# Patient Record
Sex: FEMALE | Race: BLACK OR AFRICAN AMERICAN | Employment: UNEMPLOYED | URBAN - METROPOLITAN AREA
[De-identification: names, ages, dates, MRNs, and addresses within clinical notes are randomized per-mention and may not be internally consistent; named-entity substitution may affect disease eponyms.]

---

## 2018-10-07 ENCOUNTER — HOSPITAL ENCOUNTER (INPATIENT)
Age: 83
LOS: 5 days | Discharge: NURSING, REHAB OR LONG-TERM CARE | DRG: 065 | End: 2018-10-12
Attending: EMERGENCY MEDICINE | Admitting: INTERNAL MEDICINE
Payer: MEDICARE

## 2018-10-07 ENCOUNTER — APPOINTMENT (OUTPATIENT)
Dept: CT IMAGING | Age: 83
DRG: 065 | End: 2018-10-07
Payer: MEDICARE

## 2018-10-07 DIAGNOSIS — I63.9 CEREBROVASCULAR ACCIDENT (CVA), UNSPECIFIED MECHANISM (HCC): Primary | ICD-10-CM

## 2018-10-07 PROBLEM — R41.4 HEMI-NEGLECT OF LEFT SIDE: Status: ACTIVE | Noted: 2018-10-07

## 2018-10-07 PROBLEM — I10 ESSENTIAL HYPERTENSION: Status: ACTIVE | Noted: 2018-10-07

## 2018-10-07 PROBLEM — F02.80 ALZHEIMER'S DEMENTIA WITHOUT BEHAVIORAL DISTURBANCE (HCC): Status: ACTIVE | Noted: 2018-10-07

## 2018-10-07 PROBLEM — G30.9 ALZHEIMER'S DEMENTIA WITHOUT BEHAVIORAL DISTURBANCE (HCC): Status: ACTIVE | Noted: 2018-10-07

## 2018-10-07 LAB
A/G RATIO: 0.8 (ref 1.1–2.2)
ALBUMIN SERPL-MCNC: 3.9 G/DL (ref 3.4–5)
ALBUMIN SERPL-MCNC: 4.2 G/DL (ref 3.4–5)
ALP BLD-CCNC: 66 U/L (ref 40–129)
ALP BLD-CCNC: 66 U/L (ref 40–129)
ALT SERPL-CCNC: 12 U/L (ref 10–40)
ALT SERPL-CCNC: 13 U/L (ref 10–40)
ANION GAP SERPL CALCULATED.3IONS-SCNC: 12 MMOL/L (ref 3–16)
APTT: 29.7 SEC (ref 26–36)
AST SERPL-CCNC: 23 U/L (ref 15–37)
AST SERPL-CCNC: 26 U/L (ref 15–37)
BASOPHILS ABSOLUTE: 0.1 K/UL (ref 0–0.2)
BASOPHILS RELATIVE PERCENT: 1 %
BILIRUB SERPL-MCNC: 0.6 MG/DL (ref 0–1)
BILIRUB SERPL-MCNC: 0.7 MG/DL (ref 0–1)
BILIRUBIN DIRECT: <0.2 MG/DL (ref 0–0.3)
BILIRUBIN, INDIRECT: ABNORMAL MG/DL (ref 0–1)
BUN BLDV-MCNC: 20 MG/DL (ref 7–20)
CALCIUM SERPL-MCNC: 9.4 MG/DL (ref 8.3–10.6)
CHLORIDE BLD-SCNC: 104 MMOL/L (ref 99–110)
CO2: 22 MMOL/L (ref 21–32)
CREAT SERPL-MCNC: 0.8 MG/DL (ref 0.6–1.2)
EOSINOPHILS ABSOLUTE: 0.2 K/UL (ref 0–0.6)
EOSINOPHILS RELATIVE PERCENT: 3.5 %
GFR AFRICAN AMERICAN: >60
GFR NON-AFRICAN AMERICAN: >60
GLOBULIN: 4.7 G/DL
GLUCOSE BLD-MCNC: 102 MG/DL (ref 70–99)
GLUCOSE BLD-MCNC: 80 MG/DL (ref 70–99)
HCT VFR BLD CALC: 41.9 % (ref 36–48)
HEMOGLOBIN: 14.2 G/DL (ref 12–16)
INR BLD: 1 (ref 0.86–1.14)
LYMPHOCYTES ABSOLUTE: 1.9 K/UL (ref 1–5.1)
LYMPHOCYTES RELATIVE PERCENT: 29.6 %
MCH RBC QN AUTO: 32.9 PG (ref 26–34)
MCHC RBC AUTO-ENTMCNC: 34 G/DL (ref 31–36)
MCV RBC AUTO: 96.9 FL (ref 80–100)
MONOCYTES ABSOLUTE: 0.5 K/UL (ref 0–1.3)
MONOCYTES RELATIVE PERCENT: 8.4 %
NEUTROPHILS ABSOLUTE: 3.6 K/UL (ref 1.7–7.7)
NEUTROPHILS RELATIVE PERCENT: 57.5 %
PDW BLD-RTO: 13.9 % (ref 12.4–15.4)
PERFORMED ON: NORMAL
PLATELET # BLD: 207 K/UL (ref 135–450)
PMV BLD AUTO: 7.3 FL (ref 5–10.5)
POTASSIUM REFLEX MAGNESIUM: 5.8 MMOL/L (ref 3.5–5.1)
PROTHROMBIN TIME: 11.4 SEC (ref 9.8–13)
RBC # BLD: 4.32 M/UL (ref 4–5.2)
SODIUM BLD-SCNC: 138 MMOL/L (ref 136–145)
TOTAL PROTEIN: 8.4 G/DL (ref 6.4–8.2)
TOTAL PROTEIN: 8.6 G/DL (ref 6.4–8.2)
TROPONIN: <0.01 NG/ML
WBC # BLD: 6.3 K/UL (ref 4–11)

## 2018-10-07 PROCEDURE — 93005 ELECTROCARDIOGRAM TRACING: CPT | Performed by: EMERGENCY MEDICINE

## 2018-10-07 PROCEDURE — 70498 CT ANGIOGRAPHY NECK: CPT

## 2018-10-07 PROCEDURE — 2580000003 HC RX 258: Performed by: INTERNAL MEDICINE

## 2018-10-07 PROCEDURE — 84484 ASSAY OF TROPONIN QUANT: CPT

## 2018-10-07 PROCEDURE — 6360000004 HC RX CONTRAST MEDICATION: Performed by: EMERGENCY MEDICINE

## 2018-10-07 PROCEDURE — 85025 COMPLETE CBC W/AUTO DIFF WBC: CPT

## 2018-10-07 PROCEDURE — 99291 CRITICAL CARE FIRST HOUR: CPT

## 2018-10-07 PROCEDURE — 85610 PROTHROMBIN TIME: CPT

## 2018-10-07 PROCEDURE — 36415 COLL VENOUS BLD VENIPUNCTURE: CPT

## 2018-10-07 PROCEDURE — 70450 CT HEAD/BRAIN W/O DYE: CPT

## 2018-10-07 PROCEDURE — 85730 THROMBOPLASTIN TIME PARTIAL: CPT

## 2018-10-07 PROCEDURE — 6360000002 HC RX W HCPCS: Performed by: INTERNAL MEDICINE

## 2018-10-07 PROCEDURE — 2060000000 HC ICU INTERMEDIATE R&B

## 2018-10-07 PROCEDURE — 83036 HEMOGLOBIN GLYCOSYLATED A1C: CPT

## 2018-10-07 PROCEDURE — 80053 COMPREHEN METABOLIC PANEL: CPT

## 2018-10-07 PROCEDURE — 70496 CT ANGIOGRAPHY HEAD: CPT

## 2018-10-07 RX ORDER — CITALOPRAM 10 MG/1
10 TABLET ORAL DAILY
COMMUNITY

## 2018-10-07 RX ORDER — SODIUM CHLORIDE 0.9 % (FLUSH) 0.9 %
10 SYRINGE (ML) INJECTION PRN
Status: DISCONTINUED | OUTPATIENT
Start: 2018-10-07 | End: 2018-10-12 | Stop reason: HOSPADM

## 2018-10-07 RX ORDER — TRAMADOL HYDROCHLORIDE 50 MG/1
50 TABLET ORAL NIGHTLY PRN
Status: ON HOLD | COMMUNITY
End: 2019-12-06 | Stop reason: HOSPADM

## 2018-10-07 RX ORDER — OXYBUTYNIN CHLORIDE 5 MG/1
5 TABLET, EXTENDED RELEASE ORAL DAILY
COMMUNITY

## 2018-10-07 RX ORDER — POLYETHYLENE GLYCOL 3350 17 G/17G
17 POWDER, FOR SOLUTION ORAL DAILY
COMMUNITY

## 2018-10-07 RX ORDER — SODIUM CHLORIDE 0.9 % (FLUSH) 0.9 %
10 SYRINGE (ML) INJECTION EVERY 12 HOURS SCHEDULED
Status: DISCONTINUED | OUTPATIENT
Start: 2018-10-07 | End: 2018-10-12 | Stop reason: HOSPADM

## 2018-10-07 RX ORDER — HYDRALAZINE HYDROCHLORIDE 20 MG/ML
10 INJECTION INTRAMUSCULAR; INTRAVENOUS EVERY 6 HOURS PRN
Status: DISCONTINUED | OUTPATIENT
Start: 2018-10-07 | End: 2018-10-07

## 2018-10-07 RX ORDER — SODIUM CHLORIDE 9 MG/ML
INJECTION, SOLUTION INTRAVENOUS CONTINUOUS
Status: DISCONTINUED | OUTPATIENT
Start: 2018-10-07 | End: 2018-10-12

## 2018-10-07 RX ORDER — ATORVASTATIN CALCIUM 40 MG/1
40 TABLET, FILM COATED ORAL NIGHTLY
Status: DISCONTINUED | OUTPATIENT
Start: 2018-10-07 | End: 2018-10-12 | Stop reason: HOSPADM

## 2018-10-07 RX ORDER — ONDANSETRON 2 MG/ML
4 INJECTION INTRAMUSCULAR; INTRAVENOUS EVERY 6 HOURS PRN
Status: DISCONTINUED | OUTPATIENT
Start: 2018-10-07 | End: 2018-10-12 | Stop reason: HOSPADM

## 2018-10-07 RX ADMIN — IOPAMIDOL 75 ML: 755 INJECTION, SOLUTION INTRAVENOUS at 09:27

## 2018-10-07 RX ADMIN — SODIUM CHLORIDE: 9 INJECTION, SOLUTION INTRAVENOUS at 23:58

## 2018-10-07 RX ADMIN — ENOXAPARIN SODIUM 40 MG: 40 INJECTION SUBCUTANEOUS at 14:41

## 2018-10-07 RX ADMIN — Medication 10 ML: at 23:58

## 2018-10-07 NOTE — H&P
Hospital Medicine History and Physical    10/7/2018    Date of Admission: 10/7/2018    Date of Service: Pt seen/examined on 10/7/2018 and admitted to inpatient. Assessment:  Active Problems:    Acute CVA (cerebrovascular accident) [facility])    Essential hypertension    Estuardo-neglect of left side    Alzheimer's dementia without behavioral disturbance  Resolved Problems:    * No resolved hospital problems. *      Plan:  1. Check MRI brain w/o contrast  2. Referral to neurology   3. Start ASA, statin  4. Check lipid panel, A1C and LFTs   5. PT/OT/ST  6. Gentle fluid hydration, monitor BMP   7. Monitor BP  8. Cont supportive care     Activities: Up with assist  Prophylaxis: lovenox  Code status: DNR-CC    ==========================================================  Chief complaint:  Chief Complaint   Patient presents with    Cerebrovascular Accident     Pt in per [facility] from [facility], Stroke Alert called pta. EMS reports aphasia, facial droop. Onset at 0700 when the nurse went in to see the pt, however per EMS the story kept changing on events and last seen normal.  BS 80 in our ER. History of Presenting Illness: This is a pleasant 80 y.o. female who lives in an ECF was brought to the ER for suspected stroke. In the ER noted to be aphasic and neglecting left side. S/p stroke alert, no TPA. Imaging shows right middle cerebral artery M2 division occlusion, near complete occlusion of intracranial left vertebral artery and questionable occlusion of the left posterior inferior cerebellar artery. Discussed with ER physician who referred for admission.     Past Medical History:      Diagnosis Date    Alzheimer disease     Anxiety     Depression     Hypertension     Muscle weakness     Thyroid disease        Past Surgical History:      Procedure Laterality Date    THYROIDECTOMY, PARTIAL      parathyroid       Medications (prior to admission):  Prior to Admission medications    Medication Sig Start Date End Date Taking? Authorizing Provider   ALPRAZolam (XANAX) 0.25 MG tablet Take 1 tablet by mouth 3 times daily as needed for Sleep or Anxiety for up to 21 doses. 12/29/14   Claudia Mitchell MD   diazepam (VALIUM) 5 MG tablet Take 1 tablet by mouth 2 times daily as needed for Anxiety. 12/29/14   Claudia Mitchell MD   ergocalciferol (ERGOCALCIFEROL) 00674 UNITS capsule Take 50,000 Units by mouth every 14 days. Historical Provider, MD   atenolol (TENORMIN) 100 MG tablet Take 100 mg by mouth daily. Historical Provider, MD       Allergy(ies):  Patient has no known allergies. Social History:  TOBACCO:  reports that she has never smoked. She has never used smokeless tobacco.  ETOH:  reports that she does not drink alcohol. Family History:  Family hx of HTN    Review of Systems:  Unable to obtain as pt aphasic      Vitals and physical examination:  BP (!) 170/49   Pulse 55   Temp 97.8 °F (36.6 °C) (Oral)   Resp 17   SpO2 98%   Gen/overall appearance: Not in acute distress. Alert. Head: Normocephalic, atraumatic  Eyes: EOMI, good acuity  ENT:- Oral mucosa moist  Neck: No JVD, thyromegaly  CVS: Nml S1S2, no MRG, RRR  Pulm: Clear bilaterally. No crackles/wheezes  Gastrointestinal: Soft, NT/ND, +BS  Musculoskeletal: No edema. Warm  Neuro: left augie-neglect, aphasic, left hemiplegia   Psychiatry: Appropriate affect. Not agitated. Skin: Warm, dry with normal turgor.  No rash    Labs/imaging/EKG:  CBC:   Recent Labs      10/07/18   0920   WBC  6.3   HGB  14.2   PLT  207     BMP:    Recent Labs      10/07/18   0920   NA  138   K  5.8*   CL  104   CO2  22   BUN  20   CREATININE  0.8   GLUCOSE  102*     Hepatic:   Recent Labs      10/07/18   0920   AST  23   ALT  12   BILITOT  0.7   ALKPHOS  66       Ct Head Wo Contrast    Result Date: 10/7/2018  EXAMINATION: CT OF THE HEAD WITHOUT CONTRAST  10/7/2018 9:07 am TECHNIQUE: CT of the head was performed without the administration of intravenous contrast. Dose [id number].  -----------------------------  Selam Avina MD  [facility]

## 2018-10-08 ENCOUNTER — APPOINTMENT (OUTPATIENT)
Dept: MRI IMAGING | Age: 83
DRG: 065 | End: 2018-10-08
Payer: MEDICARE

## 2018-10-08 LAB
ANION GAP SERPL CALCULATED.3IONS-SCNC: 13 MMOL/L (ref 3–16)
BASOPHILS ABSOLUTE: 0.1 K/UL (ref 0–0.2)
BASOPHILS RELATIVE PERCENT: 1 %
BUN BLDV-MCNC: 17 MG/DL (ref 7–20)
CALCIUM SERPL-MCNC: 9.2 MG/DL (ref 8.3–10.6)
CHLORIDE BLD-SCNC: 103 MMOL/L (ref 99–110)
CHOLESTEROL, TOTAL: 199 MG/DL (ref 0–199)
CO2: 23 MMOL/L (ref 21–32)
CREAT SERPL-MCNC: 1 MG/DL (ref 0.6–1.2)
EOSINOPHILS ABSOLUTE: 0.1 K/UL (ref 0–0.6)
EOSINOPHILS RELATIVE PERCENT: 2.2 %
ESTIMATED AVERAGE GLUCOSE: 122.6 MG/DL
GFR AFRICAN AMERICAN: >60
GFR NON-AFRICAN AMERICAN: 52
GLUCOSE BLD-MCNC: 110 MG/DL (ref 70–99)
HBA1C MFR BLD: 5.9 %
HCT VFR BLD CALC: 39.5 % (ref 36–48)
HDLC SERPL-MCNC: 41 MG/DL (ref 40–60)
HEMOGLOBIN: 13.4 G/DL (ref 12–16)
LDL CHOLESTEROL CALCULATED: 139 MG/DL
LYMPHOCYTES ABSOLUTE: 1.7 K/UL (ref 1–5.1)
LYMPHOCYTES RELATIVE PERCENT: 27.2 %
MCH RBC QN AUTO: 32.6 PG (ref 26–34)
MCHC RBC AUTO-ENTMCNC: 33.9 G/DL (ref 31–36)
MCV RBC AUTO: 96.1 FL (ref 80–100)
MONOCYTES ABSOLUTE: 0.6 K/UL (ref 0–1.3)
MONOCYTES RELATIVE PERCENT: 9 %
NEUTROPHILS ABSOLUTE: 3.8 K/UL (ref 1.7–7.7)
NEUTROPHILS RELATIVE PERCENT: 60.6 %
PDW BLD-RTO: 13.9 % (ref 12.4–15.4)
PLATELET # BLD: 233 K/UL (ref 135–450)
PMV BLD AUTO: 7.2 FL (ref 5–10.5)
POTASSIUM REFLEX MAGNESIUM: 3.9 MMOL/L (ref 3.5–5.1)
RBC # BLD: 4.11 M/UL (ref 4–5.2)
SODIUM BLD-SCNC: 139 MMOL/L (ref 136–145)
TRIGL SERPL-MCNC: 94 MG/DL (ref 0–150)
VLDLC SERPL CALC-MCNC: 19 MG/DL
WBC # BLD: 6.3 K/UL (ref 4–11)

## 2018-10-08 PROCEDURE — 36415 COLL VENOUS BLD VENIPUNCTURE: CPT

## 2018-10-08 PROCEDURE — 97530 THERAPEUTIC ACTIVITIES: CPT

## 2018-10-08 PROCEDURE — 2580000003 HC RX 258: Performed by: INTERNAL MEDICINE

## 2018-10-08 PROCEDURE — G8997 SWALLOW GOAL STATUS: HCPCS

## 2018-10-08 PROCEDURE — 2060000000 HC ICU INTERMEDIATE R&B

## 2018-10-08 PROCEDURE — 93010 ELECTROCARDIOGRAM REPORT: CPT | Performed by: INTERNAL MEDICINE

## 2018-10-08 PROCEDURE — 6360000002 HC RX W HCPCS: Performed by: INTERNAL MEDICINE

## 2018-10-08 PROCEDURE — 92610 EVALUATE SWALLOWING FUNCTION: CPT

## 2018-10-08 PROCEDURE — 97162 PT EVAL MOD COMPLEX 30 MIN: CPT

## 2018-10-08 PROCEDURE — G8996 SWALLOW CURRENT STATUS: HCPCS

## 2018-10-08 PROCEDURE — G8979 MOBILITY GOAL STATUS: HCPCS

## 2018-10-08 PROCEDURE — 92526 ORAL FUNCTION THERAPY: CPT

## 2018-10-08 PROCEDURE — 80061 LIPID PANEL: CPT

## 2018-10-08 PROCEDURE — 6370000000 HC RX 637 (ALT 250 FOR IP): Performed by: INTERNAL MEDICINE

## 2018-10-08 PROCEDURE — G8978 MOBILITY CURRENT STATUS: HCPCS

## 2018-10-08 PROCEDURE — 80048 BASIC METABOLIC PNL TOTAL CA: CPT

## 2018-10-08 PROCEDURE — G8987 SELF CARE CURRENT STATUS: HCPCS

## 2018-10-08 PROCEDURE — 85025 COMPLETE CBC W/AUTO DIFF WBC: CPT

## 2018-10-08 PROCEDURE — G8988 SELF CARE GOAL STATUS: HCPCS

## 2018-10-08 PROCEDURE — 97166 OT EVAL MOD COMPLEX 45 MIN: CPT

## 2018-10-08 RX ORDER — ASPIRIN 300 MG/1
300 SUPPOSITORY RECTAL DAILY
Status: DISCONTINUED | OUTPATIENT
Start: 2018-10-08 | End: 2018-10-11

## 2018-10-08 RX ORDER — HYDRALAZINE HYDROCHLORIDE 20 MG/ML
10 INJECTION INTRAMUSCULAR; INTRAVENOUS EVERY 6 HOURS PRN
Status: DISCONTINUED | OUTPATIENT
Start: 2018-10-08 | End: 2018-10-12 | Stop reason: HOSPADM

## 2018-10-08 RX ADMIN — ASPIRIN 300 MG: 300 SUPPOSITORY RECTAL at 13:00

## 2018-10-08 RX ADMIN — HYDRALAZINE HYDROCHLORIDE 10 MG: 20 INJECTION INTRAMUSCULAR; INTRAVENOUS at 17:01

## 2018-10-08 RX ADMIN — Medication 10 ML: at 09:21

## 2018-10-08 RX ADMIN — ENOXAPARIN SODIUM 40 MG: 40 INJECTION SUBCUTANEOUS at 09:21

## 2018-10-08 RX ADMIN — SODIUM CHLORIDE: 9 INJECTION, SOLUTION INTRAVENOUS at 13:00

## 2018-10-08 ASSESSMENT — PAIN SCALES - PAIN ASSESSMENT IN ADVANCED DEMENTIA (PAINAD)
FACIALEXPRESSION: 0
NEGVOCALIZATION: 0
TOTALSCORE: 0
BREATHING: 0
TOTALSCORE: 0
CONSOLABILITY: 0
BODYLANGUAGE: 0
NEGVOCALIZATION: 0
CONSOLABILITY: 0
BREATHING: 0
BODYLANGUAGE: 0
FACIALEXPRESSION: 0

## 2018-10-08 ASSESSMENT — PAIN SCALES - WONG BAKER
WONGBAKER_NUMERICALRESPONSE: 0

## 2018-10-08 NOTE — PROGRESS NOTES
Attempted to see patient, family not at bedside. Introduced self to patient and left stroke booklet at bedside. Patient confused and has baseline dementia -unable to carry on conversation. Notified nurse. Nurse will let me know when Carlos Hearing returns.

## 2018-10-08 NOTE — PLAN OF CARE
Problem: Risk for Impaired Skin Integrity  Goal: Tissue integrity - skin and mucous membranes  Structural intactness and normal physiological function of skin and  mucous membranes. Outcome: Ongoing      Problem: Falls - Risk of:  Goal: Will remain free from falls  Will remain free from falls   Outcome: Ongoing      Problem: ACTIVITY INTOLERANCE/IMPAIRED MOBILITY  Goal: Mobility/activity is maintained at optimum level for patient  Outcome: Ongoing      Problem: COMMUNICATION IMPAIRMENT  Goal: Ability to express needs and understand communication  Outcome: Ongoing      Comments: Pt awakens easily to name. Does not respond to questions or commands. Does not verbalize needs. Scores 15 on NIH. Pt incontinent of urine and stool. Pt bathed at this time. Lotion applied to dry flaky skin. Check, change and reposition Q2H/PRN. Fall precautions in place. Camera in room and operating. Bed in lowest position with brakes locked and bed alarm set. Room directly across from nursing station.

## 2018-10-08 NOTE — PROGRESS NOTES
Requires cues for all  Sequencing: Requires cues for all  Perception  Overall Perceptual Status:  (difficult to assess d/t cognitive status and decreased arousal)     Sensation  Overall Sensation Status:  (BLAS d/t pt's cognitive deficits)        LUE PROM (degrees)  LUE General PROM: shoulder flexion PROM 0-~90 degrees; elbow flexion PROM 0-~70 degrees; distally PROM appeared WFL  LUE AROM (degrees)  LUE General AROM: unable to assess d/t pt's cognitive deficits  RUE PROM (degrees)  RUE General PROM: pt resistive with shoulder flexion PROM past 90 degrees  RUE AROM (degrees)  RUE General AROM: unable to assess d/t pt's cognitive deficits - pt flexed shoulder to approx 45 degrees and flexed elbow close to full AROM to scratch around nose  LUE Strength  LUE Strength Comment: unable to assess bilaterally d/t pt's cognitive deficits                  Assessment   Performance deficits / Impairments: Decreased functional mobility ; Decreased ADL status; Decreased ROM; Decreased strength;Decreased cognition;Decreased endurance;Decreased balance  Assessment: Pt is not at baseline level of function and will benefit from skilled OT in order to address the above limitations. Treatment Diagnosis: Decreased functional mobility, ADL status, ROM, strength, functional activity tolerance, balance, cognition related to CVA  Prognosis: Fair;Poor  Decision Making: Medium Complexity  History: lives at [facility], has assist for ADLs/transfers/mobility for safety  Exam: PROM, balance, mobility  Assistance / Modification: assist of 2  Patient Education: Role of OT, OT eval  Barriers to Learning: cognition  REQUIRES OT FOLLOW UP: Yes  Activity Tolerance  Activity Tolerance: Treatment limited secondary to decreased cognition;Patient limited by fatigue  Safety Devices  Safety Devices in place: Yes  Type of devices: All fall risk precautions in place; Left in chair;Chair alarm in place;Nurse notified;Call light within reach; Patient at risk for falls

## 2018-10-08 NOTE — PROGRESS NOTES
Shift assessment complete. VSS at this time. Pt to remain NPO per speech due to aspiration risk. Pt daughter [facility], visiting and updated on POC, all questions in regards to stroke answered to best ability but will also refer stroke educator to visit pt. Pt currently sitting in chair at this time, chair alarm in place. Video monitor remains in use. Call light within reach, will continue to monitor.

## 2018-10-08 NOTE — PROGRESS NOTES
Speech Therapy for Speech/Dysphagia treatment at discharge.     Signature: Ely Issa, [address] [facility]  Speech Language Pathologist

## 2018-10-08 NOTE — PROGRESS NOTES
Home at [facility] - pt is a LTC resident. Pt can typically follow simple one-step commands. Pt is taken to the bathroom every couple hours, sometimes will initiate getting up on her own but usually requires assist for safety. Objective     Observation/Palpation  Posture: Fair  Observation: cervical spine flexed and turned to L - pt unable to correct with gentle tactile or verbal cues    PROM RLE (degrees)  RLE PROM: Exceptions  RLE General PROM: Pt demonstrated minimal resistance with PROM of hip flexion, knee flexion, and ankle DF  AROM RLE (degrees)  RLE AROM: WFL  RLE General AROM: Pt able to transfer to sit EOB functionally   PROM LLE (degrees)  LLE PROM: Exceptions  LLE General PROM: Pt demonstrated resistance of L LE with PROM of hip flexion, knee flexion, and ankle DF  AROM LLE (degrees)  LLE AROM : WFL  LLE General AROM: Pt able to transfer to sit EOB functionally;   Strength RLE  Strength RLE: Exception  Strength LLE  Strength LLE: Exception  Strength Other  Other: Pt unable to to participate in actively moving bilateral LE to EOB; Pt cognitive status limited   Tone RLE  RLE Tone: Normotonic  Tone LLE  LLE Tone: Hypertonic  Tone Description: When PROM tested bilaterally, resistance provided by pt through LE (L worse than R); unable to fully distinguish tone from resistance at this time  Sensation  Overall Sensation Status:  (BLAS d/t pt's cognitive deficits;  Pt responded with some movement upon stimuli to bilateral LE)  Bed mobility  Rolling to Left: Dependent/Total (of 2 to check brief)  Supine to Sit: Dependent/Total (of 2)  Scooting: Dependent/Total (of 2)  Comment: Pt unable to initiate bed mobility due to cogntive status  Transfers  Stand Pivot Transfers: Dependent/Total (Max assistx2 to chair)        Balance  Posture: Fair  Sitting - Static: Fair;-  Sitting - Dynamic: Poor  Comments: Pt required max assistx1 to assume midline without verbal cues; CGA with verbal cues        Assessment   Body

## 2018-10-08 NOTE — PLAN OF CARE
Problem: Risk for Impaired Skin Integrity  Goal: Tissue integrity - skin and mucous membranes  Structural intactness and normal physiological function of skin and  mucous membranes. Intervention: SKIN ASSESSMENT  Skin remains dry and intact at this time. No signs of breakdown. Problem: Falls - Risk of: Intervention: Assess risk factors for falls  Pt currently high fall risk, up to chair at this time. Chair alarm in place. Video monitor in use. Problem: Nutrition  Intervention: Swallowing evaluation  Pt evaluated by speech. To remain NPO at this time.

## 2018-10-09 LAB
CHOLESTEROL, TOTAL: 193 MG/DL (ref 0–199)
GLUCOSE BLD-MCNC: 105 MG/DL (ref 70–99)
GLUCOSE BLD-MCNC: 105 MG/DL (ref 70–99)
GLUCOSE BLD-MCNC: 109 MG/DL (ref 70–99)
GLUCOSE BLD-MCNC: 91 MG/DL (ref 70–99)
GLUCOSE BLD-MCNC: 92 MG/DL (ref 70–99)
HDLC SERPL-MCNC: 42 MG/DL (ref 40–60)
LDL CHOLESTEROL CALCULATED: 132 MG/DL
LV EF: 63 %
LVEF MODALITY: NORMAL
PERFORMED ON: ABNORMAL
PERFORMED ON: NORMAL
PERFORMED ON: NORMAL
TRIGL SERPL-MCNC: 93 MG/DL (ref 0–150)
VLDLC SERPL CALC-MCNC: 19 MG/DL

## 2018-10-09 PROCEDURE — 97530 THERAPEUTIC ACTIVITIES: CPT

## 2018-10-09 PROCEDURE — 2060000000 HC ICU INTERMEDIATE R&B

## 2018-10-09 PROCEDURE — 2580000003 HC RX 258: Performed by: INTERNAL MEDICINE

## 2018-10-09 PROCEDURE — 80061 LIPID PANEL: CPT

## 2018-10-09 PROCEDURE — 6360000002 HC RX W HCPCS: Performed by: INTERNAL MEDICINE

## 2018-10-09 PROCEDURE — 93306 TTE W/DOPPLER COMPLETE: CPT

## 2018-10-09 PROCEDURE — 92526 ORAL FUNCTION THERAPY: CPT

## 2018-10-09 PROCEDURE — 36415 COLL VENOUS BLD VENIPUNCTURE: CPT

## 2018-10-09 PROCEDURE — 6370000000 HC RX 637 (ALT 250 FOR IP): Performed by: INTERNAL MEDICINE

## 2018-10-09 RX ADMIN — Medication 10 ML: at 09:01

## 2018-10-09 RX ADMIN — ASPIRIN 300 MG: 300 SUPPOSITORY RECTAL at 09:00

## 2018-10-09 RX ADMIN — SODIUM CHLORIDE: 9 INJECTION, SOLUTION INTRAVENOUS at 03:38

## 2018-10-09 RX ADMIN — SODIUM CHLORIDE: 9 INJECTION, SOLUTION INTRAVENOUS at 18:11

## 2018-10-09 RX ADMIN — ENOXAPARIN SODIUM 40 MG: 40 INJECTION SUBCUTANEOUS at 09:00

## 2018-10-09 ASSESSMENT — PAIN SCALES - PAIN ASSESSMENT IN ADVANCED DEMENTIA (PAINAD)
TOTALSCORE: 0
TOTALSCORE: 0
NEGVOCALIZATION: 0
CONSOLABILITY: 0
TOTALSCORE: 0
BODYLANGUAGE: 0
BREATHING: 0
NEGVOCALIZATION: 0
FACIALEXPRESSION: 0
TOTALSCORE: 0
NEGVOCALIZATION: 0
BODYLANGUAGE: 0
BREATHING: 0
CONSOLABILITY: 0
TOTALSCORE: 0
TOTALSCORE: 0
CONSOLABILITY: 0
CONSOLABILITY: 0
NEGVOCALIZATION: 0
NEGVOCALIZATION: 0
BODYLANGUAGE: 0
BREATHING: 0
BREATHING: 0
BODYLANGUAGE: 0
TOTALSCORE: 0
BREATHING: 0
BODYLANGUAGE: 0
CONSOLABILITY: 0
FACIALEXPRESSION: 0
NEGVOCALIZATION: 0
NEGVOCALIZATION: 0
CONSOLABILITY: 0
TOTALSCORE: 0
FACIALEXPRESSION: 0
NEGVOCALIZATION: 0
FACIALEXPRESSION: 0
FACIALEXPRESSION: 0
BREATHING: 0
CONSOLABILITY: 0
FACIALEXPRESSION: 0
TOTALSCORE: 0
BODYLANGUAGE: 0
FACIALEXPRESSION: 0
CONSOLABILITY: 0
NEGVOCALIZATION: 0
BREATHING: 0
BODYLANGUAGE: 0
CONSOLABILITY: 0

## 2018-10-09 ASSESSMENT — PAIN SCALES - WONG BAKER
WONGBAKER_NUMERICALRESPONSE: 0

## 2018-10-09 NOTE — PROGRESS NOTES
Daily  Current Treatment Recommendations: Strengthening, ROM, Balance Training, Transfer Training, Gait Training, Functional Mobility Training, Neuromuscular Re-education, Manual Therapy - Joint Manipulation  Plan Comment: limited participation due to cognitive status   Safety Devices  Type of devices: All fall risk precautions in place, Call light within reach, Patient at risk for falls, Bed alarm in place, Left in bed (transport arrived at end of therapy)  Restraints  Initially in place: No                 AM-PAC Score  AM-PAC Inpatient Mobility Raw Score : 6  AM-PAC Inpatient T-Scale Score : 23.55  Mobility Inpatient CMS 0-100% Score: 100  Mobility Inpatient CMS G-Code Modifier : CN          Goals  Short term goals  Time Frame for Short term goals: Discharge  Short term goal 1: Pt will be able to perform bed mobility max assistx1  Short term goal 2: Pt will be able to perform sit to stand with AAD and max assistx1  Short term goal 3: Pt will be able to perform stand pivot transfer with max assistx1  Short term goal 4: Pt will be able to ambulte 15' with AAD and mod assistx1  Long term goals  Time Frame for Long term goals : LTG=STG   No goals met this session    Therapy Time   Individual Concurrent Group Co-treatment   Time In 1141         Time Out 1206         Minutes 25         Timed Code Treatment Minutes: 25 Minutes     Zulay Martinez, SPT     PT observed and directed the above evaluation/treatment.   [address], DPT [id number]

## 2018-10-09 NOTE — PROGRESS NOTES
activity EOB, pt participated less and kepts eyes shut. Bed mobility  Comment: Pt unable to initiate bed mobility due to cognitive status                          Cognition  Overall Cognitive Status: Exceptions  Arousal/Alertness: Inconsistent responses to stimuli  Following Commands: Does not follow commands (followed one command - pt able to squeeze therapist's R hand)  Attention Span: Unable to maintain attention  Initiation: Requires cues for all  Sequencing: Requires cues for all                    Type of ROM/Therapeutic Exercise  Type of ROM/Therapeutic Exercise: PROM  Comment: pt tolerated 2-3 min of PROM while supine in bed - shoulder ABduction to approx 90 degrees, elbow flexion to approx full range, supination/pronation to reduce stiffness                    Assessment   Performance deficits / Impairments: Decreased functional mobility ; Decreased ADL status; Decreased ROM; Decreased strength;Decreased cognition;Decreased endurance;Decreased balance  Assessment: Pt is not at baseline level of function and will benefit from skilled OT in order to address the above limitations. Pt with minimal participation this date, followed one command - limited by decreased cognition and lethargy. Treatment Diagnosis: Decreased functional mobility, ADL status, ROM, strength, functional activity tolerance, balance, cognition related to CVA  Prognosis: Fair;Poor  Patient Education: Role of OT, OT eval, midline orientation  Barriers to Learning: cognition, lethargy  REQUIRES OT FOLLOW UP: Yes  Activity Tolerance  Activity Tolerance: Treatment limited secondary to decreased cognition;Patient limited by fatigue  Safety Devices  Safety Devices in place: Yes  Type of devices: Left in bed;Bed alarm in place; Patient at risk for falls; All fall risk precautions in place;Nurse notified (transport present)          Plan   Plan  Times per week: 2-5x (POC downgraded d/t minimal participation)  Times per day: Daily  Specific

## 2018-10-09 NOTE — PROGRESS NOTES
Reassessment completed. No acute changes in pt since last assessment. Pt checked and found to be clean an dry. Pt turned and repositioned with wedge. Will continue to monitor.

## 2018-10-09 NOTE — PLAN OF CARE
Problem: Falls - Risk of:  Goal: Will remain free from falls  Will remain free from falls   Outcome: Ongoing  Pt remains free from falls. Safety precautions in place. Bed in lowest position, bed wheels locked, call light with in reach, bed alarm on, yellow blanket in place, fall risk wrist band on, SAFE outside of doorway. Will continue to monitor.

## 2018-10-10 LAB
GLUCOSE BLD-MCNC: 72 MG/DL (ref 70–99)
GLUCOSE BLD-MCNC: 73 MG/DL (ref 70–99)
GLUCOSE BLD-MCNC: 81 MG/DL (ref 70–99)
GLUCOSE BLD-MCNC: 89 MG/DL (ref 70–99)
PERFORMED ON: NORMAL

## 2018-10-10 PROCEDURE — 2580000003 HC RX 258: Performed by: INTERNAL MEDICINE

## 2018-10-10 PROCEDURE — 2060000000 HC ICU INTERMEDIATE R&B

## 2018-10-10 PROCEDURE — 99223 1ST HOSP IP/OBS HIGH 75: CPT | Performed by: PSYCHIATRY & NEUROLOGY

## 2018-10-10 PROCEDURE — 92526 ORAL FUNCTION THERAPY: CPT

## 2018-10-10 PROCEDURE — 6360000002 HC RX W HCPCS: Performed by: INTERNAL MEDICINE

## 2018-10-10 PROCEDURE — 92523 SPEECH SOUND LANG COMPREHEN: CPT

## 2018-10-10 PROCEDURE — 6370000000 HC RX 637 (ALT 250 FOR IP): Performed by: INTERNAL MEDICINE

## 2018-10-10 RX ADMIN — Medication 10 ML: at 09:30

## 2018-10-10 RX ADMIN — SODIUM CHLORIDE: 9 INJECTION, SOLUTION INTRAVENOUS at 01:10

## 2018-10-10 RX ADMIN — ASPIRIN 300 MG: 300 SUPPOSITORY RECTAL at 09:27

## 2018-10-10 RX ADMIN — ENOXAPARIN SODIUM 40 MG: 40 INJECTION SUBCUTANEOUS at 09:30

## 2018-10-10 ASSESSMENT — PAIN SCALES - PAIN ASSESSMENT IN ADVANCED DEMENTIA (PAINAD)
NEGVOCALIZATION: 0
BREATHING: 0
TOTALSCORE: 0
BREATHING: 0
BODYLANGUAGE: 0
FACIALEXPRESSION: 0
BODYLANGUAGE: 0
TOTALSCORE: 0
CONSOLABILITY: 0
TOTALSCORE: 0
BODYLANGUAGE: 0
FACIALEXPRESSION: 0
BREATHING: 0
CONSOLABILITY: 0
FACIALEXPRESSION: 0
CONSOLABILITY: 0

## 2018-10-10 ASSESSMENT — PAIN SCALES - WONG BAKER
WONGBAKER_NUMERICALRESPONSE: 0

## 2018-10-10 NOTE — PROGRESS NOTES
Speech Language Pathology  Dysphagia Treatment Note    Name:  Nena Hassan  :   1930  Medical Diagnosis:  Acute CVA (cerebrovascular accident) ([facility] [address] [I63.9]  Acute CVA (cerebrovascular accident) ([facility] [address] [I63.9]  Treatment Diagnosis: Oropharyngeal Dysphagia  Pain level: Did not state     Current Diet Level: NPO     Assessment of Texture Tolerance:  -Impressions:  Pt was positioned upright in bed on RA. Son and daughter in law present and able to provide more hx. The reported pt typically consumed soft foods with thin liquids. ECF had reported that pt was pocketing food at times. Family assisted with assessment of swallow function as pt appeared to respond better when directions and cues were provided by her son. Oral mechanism examination revealed flaccid left side with reduced strength and inability to retract or protrude lips. Lingual protrusion revealed severe left tongue deviation with decreased coordination. Pt required pillows to assist with repositioning. Intermittently throughout pt demonstrate yawning. Trials of thin liquids, nectar thick liquids and puree were provided to assess swallow function. Thin liquids revealed reduced bolus control, some anterior bolus loss, suspected pharyngeal pooling and delayed swallow initiation. Nectar thick liquids revealed reduced bolus manipulation with intermittent oral holding suspected pharyngeal pooling and immediate coughing post swallow. Puree revealed oral holding, delayed/decreased bolus manipulation, suspected prolonged pharyngeal pooling, delayed swallow initiation, suspected pharyngeal stasis and delayed coughing. Post trial of puree pt appeared to fall asleep, trials were ceased at that time. Overall pt demonstrates high risk for aspiration due to CVA, baseline dementia, severity of oral weakness and suspected decreased airway protection with s/s of aspiration/penetration.  At this time pt appears unable to maintain extended periods of alertness in order

## 2018-10-10 NOTE — CONSULTS
Palliative Care:     Patient seen on 10/8 and spoke with patient's son and legal guardian Stefano. [facility] Discussed with him, neurologist would see patient. He is aware of severe dysphagia. Left message for son this AM and left contact information. Awaiting return call.
consultation        Please note a portion of this chart was generated using dragon dictation software. Although every effort was made to ensure the accuracy of this automated transcription, some errors in transcription may have occurred.

## 2018-10-10 NOTE — PROGRESS NOTES
soft, no tenderness, not distended, normal bowel sounds  Musculoskeletal: No cyanosis in digits, neck supple  Neurology: left arm 0/5, left leg withdraws to pain, difficult neuro assessment   Psych: not oriented to time, place and person  Skin: Warm, dry, normal turgor  Extremity exam shows brisk capillary refill. Peripheral pulses are palpable in lower extremities     Labs and Tests:  CBC:   Recent Labs      10/08/18   0439   WBC  6.3   HGB  13.4   PLT  233     BMP:  Recent Labs      10/08/18   0439   NA  139   K  3.9   CL  103   CO2  23   BUN  17   CREATININE  1.0   GLUCOSE  110*     Hepatic: No results for input(s): AST, ALT, ALB, BILITOT, ALKPHOS in the last 72 hours. CTA Head W Contrast   Final Result   Right middle cerebral artery M2 division occlusion. 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 Questionable   occlusion of left posterior inferior cerebellar artery. Findings were discussed with Aldean Res at 10:17 am on 10/7/2018. 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 Questionable   occlusion of left posterior inferior cerebellar artery. Findings were discussed with Aldean Res at 10:17 am on 10/7/2018. CT Head WO Contrast   Final Result   1. No intracranial hemorrhage   2. No CT changes of acute major vascular territory brain ischemia at this time          [facility].    (Results Pending)       Echo reviewed    Problem List  Active Problems:    Acute CVA (cerebrovascular accident) [facility])    Essential hypertension    Estuardo-neglect of left side    Alzheimer's dementia without behavioral disturbance  Resolved Problems:    * No resolved hospital problems.  *       Assessment & Plan:   Acute ischemic stroke  Stroke team eval in the ER, No tPA candidate  Left sided neglect   Rectal asa, Echo reviewed, MRI could not

## 2018-10-10 NOTE — PLAN OF CARE
Problem: Risk for Impaired Skin Integrity  Goal: Tissue integrity - skin and mucous membranes  Structural intactness and normal physiological function of skin and  mucous membranes. Outcome: Ongoing  Repositions frequently with staff assist.  Incontinent bowel/bladder, purewick catheter placed d/t incontinence. Problem: Falls - Risk of:  Goal: Will remain free from falls  Will remain free from falls   Outcome: Ongoing  Resting in bed, bed locked and in lowest position, bed alarm in place. Call light within reach, avasys monitor in place for pt safety. Problem: ACTIVITY INTOLERANCE/IMPAIRED MOBILITY  Goal: Mobility/activity is maintained at optimum level for patient  Outcome: Ongoing  Remains dependent on staff for assist.  Unable to express needs verbally, continues with weakness.

## 2018-10-11 ENCOUNTER — APPOINTMENT (OUTPATIENT)
Dept: CT IMAGING | Age: 83
DRG: 065 | End: 2018-10-11
Payer: MEDICARE

## 2018-10-11 LAB
GLUCOSE BLD-MCNC: 102 MG/DL (ref 70–99)
GLUCOSE BLD-MCNC: 61 MG/DL (ref 70–99)
GLUCOSE BLD-MCNC: 62 MG/DL (ref 70–99)
GLUCOSE BLD-MCNC: 63 MG/DL (ref 70–99)
GLUCOSE BLD-MCNC: 73 MG/DL (ref 70–99)
GLUCOSE BLD-MCNC: 82 MG/DL (ref 70–99)
GLUCOSE BLD-MCNC: 92 MG/DL (ref 70–99)
GLUCOSE BLD-MCNC: 96 MG/DL (ref 70–99)
PERFORMED ON: ABNORMAL
PERFORMED ON: NORMAL

## 2018-10-11 PROCEDURE — 97110 THERAPEUTIC EXERCISES: CPT

## 2018-10-11 PROCEDURE — 92526 ORAL FUNCTION THERAPY: CPT

## 2018-10-11 PROCEDURE — 97530 THERAPEUTIC ACTIVITIES: CPT

## 2018-10-11 PROCEDURE — 70450 CT HEAD/BRAIN W/O DYE: CPT

## 2018-10-11 PROCEDURE — 99233 SBSQ HOSP IP/OBS HIGH 50: CPT | Performed by: PSYCHIATRY & NEUROLOGY

## 2018-10-11 PROCEDURE — 2060000000 HC ICU INTERMEDIATE R&B

## 2018-10-11 PROCEDURE — 6370000000 HC RX 637 (ALT 250 FOR IP): Performed by: INTERNAL MEDICINE

## 2018-10-11 PROCEDURE — 2580000003 HC RX 258

## 2018-10-11 PROCEDURE — 6360000002 HC RX W HCPCS: Performed by: INTERNAL MEDICINE

## 2018-10-11 PROCEDURE — 92507 TX SP LANG VOICE COMM INDIV: CPT

## 2018-10-11 PROCEDURE — 2580000003 HC RX 258: Performed by: INTERNAL MEDICINE

## 2018-10-11 RX ORDER — DEXTROSE MONOHYDRATE 25 G/50ML
INJECTION, SOLUTION INTRAVENOUS
Status: DISCONTINUED
Start: 2018-10-11 | End: 2018-10-11 | Stop reason: WASHOUT

## 2018-10-11 RX ORDER — ASPIRIN 81 MG/1
81 TABLET, CHEWABLE ORAL DAILY
Status: DISCONTINUED | OUTPATIENT
Start: 2018-10-12 | End: 2018-10-12 | Stop reason: HOSPADM

## 2018-10-11 RX ORDER — ATENOLOL 50 MG/1
100 TABLET ORAL DAILY
Status: DISCONTINUED | OUTPATIENT
Start: 2018-10-11 | End: 2018-10-12 | Stop reason: HOSPADM

## 2018-10-11 RX ORDER — DEXTROSE MONOHYDRATE 25 G/50ML
INJECTION, SOLUTION INTRAVENOUS
Status: COMPLETED
Start: 2018-10-11 | End: 2018-10-11

## 2018-10-11 RX ADMIN — ENOXAPARIN SODIUM 40 MG: 40 INJECTION SUBCUTANEOUS at 09:39

## 2018-10-11 RX ADMIN — DESMOPRESSIN ACETATE 40 MG: 0.2 TABLET ORAL at 20:44

## 2018-10-11 RX ADMIN — SODIUM CHLORIDE: 9 INJECTION, SOLUTION INTRAVENOUS at 04:10

## 2018-10-11 RX ADMIN — ATENOLOL 100 MG: 50 TABLET ORAL at 19:22

## 2018-10-11 RX ADMIN — ASPIRIN 300 MG: 300 SUPPOSITORY RECTAL at 09:39

## 2018-10-11 RX ADMIN — DEXTROSE MONOHYDRATE 50 ML: 25 INJECTION, SOLUTION INTRAVENOUS at 07:30

## 2018-10-11 ASSESSMENT — PAIN SCALES - WONG BAKER
WONGBAKER_NUMERICALRESPONSE: 0

## 2018-10-11 NOTE — PROGRESS NOTES
[facility] PROGRESS NOTE    10/11/2018 2:15 PM        Name: Dangelo Calvillo . Admitted: 10/7/2018  Primary Care Provider: Byron Burgos MD (Tel: [phone])    Brief Course:        CC:left side neglect  Subjective:  . Overnight issues noticed with episode of hypoglycemia  Non verbal  Does not move her left arm   Able to eat better on dysphagia 1 diet    Reviewed interval ancillary notes    Current Medications    [START ON 10/12/2018] aspirin chewable tablet 81 mg Daily   atenolol (TENORMIN) tablet 100 mg Daily   hydrALAZINE (APRESOLINE) injection 10 mg Q6H PRN   sodium chloride flush 0.9 % injection 10 mL 2 times per day   sodium chloride flush 0.9 % injection 10 mL PRN   magnesium hydroxide (MILK OF MAGNESIA) 400 MG/5ML suspension 30 mL Daily PRN   ondansetron (ZOFRAN) injection 4 mg Q6H PRN   enoxaparin (LOVENOX) injection 40 mg Daily   influenza quadrivalent split vaccine (FLUZONE;FLUARIX;FLULAVAL;AFLURIA) injection 0.5 mL Once   atorvastatin (LIPITOR) tablet 40 mg Nightly   0.9 % sodium chloride infusion Continuous       Objective:  BP (!) 174/78   Pulse 64   Temp 97.3 °F (36.3 °C) (Temporal)   Resp 18   Ht 5' 6.14\" (1.68 m) Comment: as of 3/21/18  Wt 165 lb 6.4 oz (75 kg) Comment: no pump, with boots on  SpO2 98%   BMI 26.58 kg/m²     Intake/Output Summary (Last 24 hours) at 10/11/18 1415  Last data filed at 10/11/18 0610   Gross per 24 hour   Intake                0 ml   Output              250 ml   Net             -250 ml         General appearance:  Appears comfortable  Eyes: Sclera clear. Pupils equal.  ENT: Moist oral mucosa. Trachea midline, no adenopathy. Cardiovascular: Regular rhythm, normal S1, S2. No murmur. No edema in lower extremities  Respiratory: Not using accessory muscles. Good inspiratory effort.  Clear to auscultation bilaterally, no wheeze or crackles. GI: Abdomen soft, no tenderness, not distended, normal bowel sounds  Musculoskeletal: No cyanosis in digits, neck supple  Neurology: left arm 0/5, left leg withdraws to pain, difficult neuro assessment   Psych: not oriented to time, place and person  Skin: Warm, dry, normal turgor  Extremity exam shows brisk capillary refill. Peripheral pulses are palpable in lower extremities     Labs and Tests:  CBC:   No results for input(s): WBC, HGB, PLT in the last 72 hours. BMP:  No results for input(s): NA, K, CL, CO2, BUN, CREATININE, GLUCOSE in the last 72 hours. Hepatic: No results for input(s): AST, ALT, ALB, BILITOT, ALKPHOS in the last 72 hours. CTA Head W Contrast   Final Result   Right middle cerebral artery M2 division occlusion. 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 Questionable   occlusion of left posterior inferior cerebellar artery. Findings were discussed with Tiffany Wood at 10:17 am on 10/7/2018. 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 Questionable   occlusion of left posterior inferior cerebellar artery. Findings were discussed with Tiffany Wood at 10:17 am on 10/7/2018. CT Head WO Contrast   Final Result   1. No intracranial hemorrhage   2. No CT changes of acute major vascular territory brain ischemia at this time         CT HEAD WO CONTRAST    (Results Pending)       Echo reviewed    Problem List  Active Problems:    Acute CVA (cerebrovascular accident) [facility])    Essential hypertension    Estuardo-neglect of left side    Alzheimer's dementia without behavioral disturbance  Resolved Problems:    * No resolved hospital problems.  *       Assessment & Plan:   Acute ischemic stroke  Stroke team eval in the ER, No tPA candidate  Left sided neglect   Rectal asa, Echo reviewed, MRI could not be

## 2018-10-12 VITALS
SYSTOLIC BLOOD PRESSURE: 155 MMHG | WEIGHT: 165.5 LBS | HEART RATE: 70 BPM | HEIGHT: 66 IN | BODY MASS INDEX: 26.6 KG/M2 | DIASTOLIC BLOOD PRESSURE: 79 MMHG | TEMPERATURE: 97.3 F | OXYGEN SATURATION: 97 % | RESPIRATION RATE: 14 BRPM

## 2018-10-12 LAB
GLUCOSE BLD-MCNC: 85 MG/DL (ref 70–99)
GLUCOSE BLD-MCNC: 88 MG/DL (ref 70–99)
GLUCOSE BLD-MCNC: 94 MG/DL (ref 70–99)
PERFORMED ON: NORMAL

## 2018-10-12 PROCEDURE — 92526 ORAL FUNCTION THERAPY: CPT

## 2018-10-12 PROCEDURE — 99233 SBSQ HOSP IP/OBS HIGH 50: CPT | Performed by: PSYCHIATRY & NEUROLOGY

## 2018-10-12 PROCEDURE — 2580000003 HC RX 258: Performed by: INTERNAL MEDICINE

## 2018-10-12 PROCEDURE — 6360000002 HC RX W HCPCS: Performed by: INTERNAL MEDICINE

## 2018-10-12 PROCEDURE — 92507 TX SP LANG VOICE COMM INDIV: CPT

## 2018-10-12 RX ORDER — ATORVASTATIN CALCIUM 40 MG/1
40 TABLET, FILM COATED ORAL NIGHTLY
Qty: 30 TABLET | Refills: 3 | Status: SHIPPED | OUTPATIENT
Start: 2018-10-12

## 2018-10-12 RX ORDER — DEXTROSE AND SODIUM CHLORIDE 5; .45 G/100ML; G/100ML
INJECTION, SOLUTION INTRAVENOUS CONTINUOUS
Status: DISCONTINUED | OUTPATIENT
Start: 2018-10-12 | End: 2018-10-12 | Stop reason: HOSPADM

## 2018-10-12 RX ORDER — ASPIRIN 81 MG/1
81 TABLET, CHEWABLE ORAL DAILY
Qty: 30 TABLET | Refills: 3 | Status: SHIPPED | OUTPATIENT
Start: 2018-10-13

## 2018-10-12 RX ADMIN — SODIUM CHLORIDE: 9 INJECTION, SOLUTION INTRAVENOUS at 05:12

## 2018-10-12 RX ADMIN — Medication 10 ML: at 09:00

## 2018-10-12 RX ADMIN — DEXTROSE AND SODIUM CHLORIDE: 5; 450 INJECTION, SOLUTION INTRAVENOUS at 10:24

## 2018-10-12 RX ADMIN — ENOXAPARIN SODIUM 40 MG: 40 INJECTION SUBCUTANEOUS at 09:48

## 2018-10-12 ASSESSMENT — PAIN SCALES - WONG BAKER
WONGBAKER_NUMERICALRESPONSE: 0

## 2018-10-12 ASSESSMENT — PAIN SCALES - GENERAL: PAINLEVEL_OUTOF10: 0

## 2018-10-12 NOTE — PROGRESS NOTES
Speech  Language And Dysphagia Treatment Note    Name: Leena Hendrickson  : 1930  Medical Diagnosis: Acute CVA (cerebrovascular accident) [facility]) [I63.9]  Acute CVA (cerebrovascular accident) ([facility].) [I63.9]  Treatment Diagnosis: Oropharyngeal Dysphagia, Dysarthria, Expressive and Receptive Deficits and Cognitive deficits   Pain: 0/10 reported by Pt    Patient's response to therapy:  Pt awake, alert and more verbally responsive this date. Dysphagia Treatment:  Current Diet Level: 1) Initiate Dysphagia I Pureed with NECTAR thick liquids, meds crushed (As able) with puree, No mixed consistencies (No chicken noodle soup, no vegetable soup, no cold cereal), No Drinking Straw  2) 1;1 assist with all intake, check for pocketing and oral care post meals   Diet texture tolerance:  Per RN, pt ws holding/pocketing puree this AM with PCA having to cease feeding. Assessment of Texture Tolerance:  -Impressions: Pt was positioned upright in bed. She appeared awake and alert however, verbal initiation was limited. Trials of puree and nectar thick liquids were provided to assess swallow function. Rolled towel was placed on left side of neck to assist with upright head positioning. Pt demonstrated oral holding with delayed bolus manipulation, decreased A-P propulsion, suspected pharyngeal pooling, delayed swallow initiation and decreased laryngeal elevation. No overt clinical s/s of aspiration/penetration were assessed this date. Overall pt demonstrates increased risk for aspiration due to left sided weakness, oral pocketing/holding, suspected reduced airway protection, delayed swallow initiation and difficulty with self feeding. She also demonstrates increased risk for nutrition/hydration compromise due to baseline dementia with baseline dysphagia, new CVA, decreased overall awareness and severity of dysphagia.  Based on report from RN and results of this assessment, pt demonstrates inconsistency in regards to ability to

## 2018-10-12 NOTE — PROGRESS NOTES
taking these medications    aspirin 81 MG chewable tablet  Take 1 tablet by mouth daily  Notes to patient:  Use: prevents heart attacks and strokes. Side effects: bleeding or bruising more easily, stomach upset. atorvastatin 40 MG tablet  Commonly known as:  LIPITOR  Take 1 tablet by mouth nightly  Notes to patient:  Use: Treatment of high cholesterol  Side effects: Nausea, flatulence, bloating        CONTINUE taking these medications    ALPRAZolam 0.25 MG tablet  Commonly known as:  XANAX  Take 1 tablet by mouth 3 times daily as needed for Sleep or Anxiety for up to 21 doses. Notes to patient:  Use: eases anxiety and calms the nerves  Side effects: feeling lightheaded, sleepy, having blurred eyesight, confusion     atenolol 100 MG tablet  Commonly known as:  TENORMIN  Notes to patient:  Use:  Lowers blood pressure and heart rate, takes workload off heart  Side effects:  Tiredness, shortness of breath, trouble sleeping, impotence     citalopram 10 MG tablet  Commonly known as:  CELEXA  Notes to patient:  Use: Treatment of depression  Side effects: Stomach upset, dry mouth, fatigue, insomnia     diazepam 5 MG tablet  Commonly known as:  VALIUM  Take 1 tablet by mouth 2 times daily as needed for Anxiety.   Notes to patient:  Use: eases anxiety and calms the nerves  Side effects: feeling lightheaded, sleepy, having blurred eyesight, confusion     ergocalciferol 80717 units capsule  Commonly known as:  ERGOCALCIFEROL  Notes to patient:  Use: treat poor parathyroid function, prevent low phosphate levels, to treat rickets, as well as other reasons, verify with physician  Side effects: Upset stomach     oxybutynin 5 MG extended release tablet  Commonly known as:  DITROPAN-XL  Notes to patient:  Use: increases how much uring the bladder can hold and decreases pain caused by spasms  Side effects: feeling lightheaded or sleepy, blurred vision, constipation, dry mouth     polyethylene glycol powder  Commonly known as: GLYCOLAX  Notes to patient:  Use: laxative, treats constipation  Side effects: diarrhea, cramping, upset stomach     traMADol 50 MG tablet  Commonly known as:  ULTRAM  Notes to patient:  Use: treatment of pain  Side effects: constipation, fatigue, dry mouth, insomnia           Where to Get Your Medications      These medications were sent to [address] [place] [address] [facility] [phone] [address] [address]    Phone:  [phone]   · aspirin 81 MG chewable tablet  · atorvastatin 40 MG tablet         Discharge recommendations given to patient. Follow Up. PCP in a week  Disposition. SNF  Activity. activity as tolerated  Diet:        Spent 35 minutes in discharge process.     Signed:  Syed Presley MD     10/13/2018 3:57 PM

## 2018-10-12 NOTE — PLAN OF CARE
Problem: Risk for Impaired Skin Integrity  Goal: Tissue integrity - skin and mucous membranes  Structural intactness and normal physiological function of skin and  mucous membranes. Continuing to turn pt Q2, pt is 2x assist, dependent. No observed skin integrity impairment. Incontinent of urine, female external catheter changed this shift and in place, will continue to monitor for samuel care needs. Problem: COMMUNICATION IMPAIRMENT  Goal: Ability to express needs and understand communication  Outcome: Ongoing  Pt remains non-verbal, grunts at times, responds to some questions with gestures and follows some commands. Problem: Safety:  Goal: Ability to chew and swallow food without choking will improve  Ability to chew and swallow food without choking will improve   Outcome: Ongoing  Pt tolerating crushed meds w/ applesauce this evening. No observed signs of aspiration, will continue to monitor.

## 2018-10-13 NOTE — DISCHARGE SUMMARY
[facility] DISCHARGE SUMMARY     Patient Demographics     Melvin. Saeid Headings  Date of Birth. 2/1/1930  MRN. [id number]      Primary care provider. Mana Rodriges MD  (Tel: [phone])     Admit date: 10/7/2018    Discharge date (blank if same as Note Date): 10/12/2018  Note Date: 10/13/2018      Reason for Hospitalization. Chief Complaint   Patient presents with    Cerebrovascular Accident       Pt in per [facility] from [facility], Stroke Alert called pta. EMS reports aphasia, facial droop. Onset at 0700 when the nurse went in to see the pt, however per EMS the story kept changing on events and last seen normal.  BS 80 in our ER.            Significant Findings. Active Problems:    Acute CVA (cerebrovascular accident) [facility])    Essential hypertension    Estuardo-neglect of left side    Alzheimer's dementia without behavioral disturbance  Resolved Problems:    * No resolved hospital problems. *        Problems and results from this hospitalization that need follow up. 1. Acute ischemic stroke follow up     Significant test results and incidental findings. 1.    CT HEAD WO CONTRAST   Final Result   New hypodensity in the right frontal lobe, consistent with subacute   infarction in the right middle  cerebral artery territory.       Mild degree of atrophy. Moderate degree of low-attenuation in the   periventricular white matter, consistent with small vessel disease.           CTA Head W Contrast   Final Result   Right middle cerebral artery M2 division occlusion.       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 Questionable   occlusion of left posterior inferior cerebellar artery.       Findings were discussed with Tiffany Wood at 10:17 am on 10/7/2018.           CTA Neck W Contrast   Final Result   Right middle cerebral artery M2 division occlusion.       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

## 2018-10-15 LAB
EKG ATRIAL RATE: 71 BPM
EKG DIAGNOSIS: NORMAL
EKG P AXIS: 68 DEGREES
EKG P-R INTERVAL: 166 MS
EKG Q-T INTERVAL: 418 MS
EKG QRS DURATION: 70 MS
EKG QTC CALCULATION (BAZETT): 454 MS
EKG R AXIS: 35 DEGREES
EKG T AXIS: 52 DEGREES
EKG VENTRICULAR RATE: 71 BPM

## 2019-06-12 ENCOUNTER — APPOINTMENT (OUTPATIENT)
Dept: GENERAL RADIOLOGY | Age: 84
DRG: 689 | End: 2019-06-12
Payer: MEDICARE

## 2019-06-12 ENCOUNTER — HOSPITAL ENCOUNTER (INPATIENT)
Age: 84
LOS: 2 days | Discharge: NURSING, REHAB OR LONG-TERM CARE | DRG: 689 | End: 2019-06-14
Attending: EMERGENCY MEDICINE | Admitting: SPECIALIST
Payer: MEDICARE

## 2019-06-12 ENCOUNTER — APPOINTMENT (OUTPATIENT)
Dept: CT IMAGING | Age: 84
DRG: 689 | End: 2019-06-12
Payer: MEDICARE

## 2019-06-12 PROBLEM — R41.0 MENTAL CONFUSION: Status: ACTIVE | Noted: 2019-06-12

## 2019-06-12 LAB
A/G RATIO: 0.9 (ref 1.1–2.2)
ALBUMIN SERPL-MCNC: 3.7 G/DL (ref 3.4–5)
ALP BLD-CCNC: 63 U/L (ref 40–129)
ALT SERPL-CCNC: 17 U/L (ref 10–40)
ANION GAP SERPL CALCULATED.3IONS-SCNC: 11 MMOL/L (ref 3–16)
AST SERPL-CCNC: 16 U/L (ref 15–37)
BACTERIA: ABNORMAL /HPF
BASOPHILS ABSOLUTE: 0.1 K/UL (ref 0–0.2)
BASOPHILS RELATIVE PERCENT: 1 %
BILIRUB SERPL-MCNC: 0.4 MG/DL (ref 0–1)
BILIRUBIN URINE: NEGATIVE
BLOOD, URINE: NEGATIVE
BUN BLDV-MCNC: 23 MG/DL (ref 7–20)
CALCIUM SERPL-MCNC: 9.7 MG/DL (ref 8.3–10.6)
CHLORIDE BLD-SCNC: 107 MMOL/L (ref 99–110)
CLARITY: ABNORMAL
CO2: 23 MMOL/L (ref 21–32)
COLOR: YELLOW
CREAT SERPL-MCNC: 1 MG/DL (ref 0.6–1.2)
EKG ATRIAL RATE: 65 BPM
EKG DIAGNOSIS: NORMAL
EKG P AXIS: 87 DEGREES
EKG P-R INTERVAL: 212 MS
EKG Q-T INTERVAL: 416 MS
EKG QRS DURATION: 70 MS
EKG QTC CALCULATION (BAZETT): 432 MS
EKG R AXIS: 33 DEGREES
EKG T AXIS: 51 DEGREES
EKG VENTRICULAR RATE: 65 BPM
EOSINOPHILS ABSOLUTE: 0.1 K/UL (ref 0–0.6)
EOSINOPHILS RELATIVE PERCENT: 1.9 %
EPITHELIAL CELLS, UA: 2 /HPF (ref 0–5)
GFR AFRICAN AMERICAN: >60
GFR NON-AFRICAN AMERICAN: 52
GLOBULIN: 4 G/DL
GLUCOSE BLD-MCNC: 104 MG/DL (ref 70–99)
GLUCOSE URINE: NEGATIVE MG/DL
HCT VFR BLD CALC: 36.1 % (ref 36–48)
HEMOGLOBIN: 12.4 G/DL (ref 12–16)
HYALINE CASTS: 1 /LPF (ref 0–8)
KETONES, URINE: NEGATIVE MG/DL
LEUKOCYTE ESTERASE, URINE: ABNORMAL
LYMPHOCYTES ABSOLUTE: 1.7 K/UL (ref 1–5.1)
LYMPHOCYTES RELATIVE PERCENT: 23.9 %
MCH RBC QN AUTO: 31.9 PG (ref 26–34)
MCHC RBC AUTO-ENTMCNC: 34.2 G/DL (ref 31–36)
MCV RBC AUTO: 93.2 FL (ref 80–100)
MICROSCOPIC EXAMINATION: YES
MONOCYTES ABSOLUTE: 0.7 K/UL (ref 0–1.3)
MONOCYTES RELATIVE PERCENT: 10 %
NEUTROPHILS ABSOLUTE: 4.4 K/UL (ref 1.7–7.7)
NEUTROPHILS RELATIVE PERCENT: 63.2 %
NITRITE, URINE: NEGATIVE
PDW BLD-RTO: 13.9 % (ref 12.4–15.4)
PH UA: 6.5 (ref 5–8)
PLATELET # BLD: 252 K/UL (ref 135–450)
PMV BLD AUTO: 6.9 FL (ref 5–10.5)
POTASSIUM REFLEX MAGNESIUM: 4.5 MMOL/L (ref 3.5–5.1)
PROTEIN UA: 30 MG/DL
RBC # BLD: 3.87 M/UL (ref 4–5.2)
RBC UA: 1 /HPF (ref 0–4)
SODIUM BLD-SCNC: 141 MMOL/L (ref 136–145)
SPECIFIC GRAVITY UA: 1.02 (ref 1–1.03)
TOTAL PROTEIN: 7.7 G/DL (ref 6.4–8.2)
TROPONIN: 0.14 NG/ML
URINE REFLEX TO CULTURE: YES
URINE TYPE: ABNORMAL
UROBILINOGEN, URINE: 1 E.U./DL
WBC # BLD: 7 K/UL (ref 4–11)
WBC UA: 9 /HPF (ref 0–5)

## 2019-06-12 PROCEDURE — 80053 COMPREHEN METABOLIC PANEL: CPT

## 2019-06-12 PROCEDURE — 36415 COLL VENOUS BLD VENIPUNCTURE: CPT

## 2019-06-12 PROCEDURE — G0378 HOSPITAL OBSERVATION PER HR: HCPCS

## 2019-06-12 PROCEDURE — 2580000003 HC RX 258: Performed by: SPECIALIST

## 2019-06-12 PROCEDURE — 85025 COMPLETE CBC W/AUTO DIFF WBC: CPT

## 2019-06-12 PROCEDURE — 93010 ELECTROCARDIOGRAM REPORT: CPT | Performed by: INTERNAL MEDICINE

## 2019-06-12 PROCEDURE — 81001 URINALYSIS AUTO W/SCOPE: CPT

## 2019-06-12 PROCEDURE — 70450 CT HEAD/BRAIN W/O DYE: CPT

## 2019-06-12 PROCEDURE — 84484 ASSAY OF TROPONIN QUANT: CPT

## 2019-06-12 PROCEDURE — 87086 URINE CULTURE/COLONY COUNT: CPT

## 2019-06-12 PROCEDURE — 96361 HYDRATE IV INFUSION ADD-ON: CPT

## 2019-06-12 PROCEDURE — 1200000000 HC SEMI PRIVATE

## 2019-06-12 PROCEDURE — 99285 EMERGENCY DEPT VISIT HI MDM: CPT

## 2019-06-12 PROCEDURE — 71045 X-RAY EXAM CHEST 1 VIEW: CPT

## 2019-06-12 PROCEDURE — 6370000000 HC RX 637 (ALT 250 FOR IP): Performed by: SPECIALIST

## 2019-06-12 PROCEDURE — 93005 ELECTROCARDIOGRAM TRACING: CPT | Performed by: EMERGENCY MEDICINE

## 2019-06-12 PROCEDURE — 6370000000 HC RX 637 (ALT 250 FOR IP)

## 2019-06-12 RX ORDER — BISACODYL 10 MG
10 SUPPOSITORY, RECTAL RECTAL DAILY PRN
COMMUNITY

## 2019-06-12 RX ORDER — ACETAMINOPHEN 325 MG/1
650 TABLET ORAL EVERY 6 HOURS PRN
Status: ON HOLD | COMMUNITY
End: 2019-06-14 | Stop reason: HOSPADM

## 2019-06-12 RX ORDER — POLYETHYLENE GLYCOL 3350 17 G/17G
17 POWDER, FOR SOLUTION ORAL DAILY
Status: DISCONTINUED | OUTPATIENT
Start: 2019-06-13 | End: 2019-06-13

## 2019-06-12 RX ORDER — ATENOLOL 25 MG/1
25 TABLET ORAL DAILY
Status: DISCONTINUED | OUTPATIENT
Start: 2019-06-13 | End: 2019-06-15 | Stop reason: HOSPADM

## 2019-06-12 RX ORDER — ATORVASTATIN CALCIUM 40 MG/1
40 TABLET, FILM COATED ORAL NIGHTLY
Status: DISCONTINUED | OUTPATIENT
Start: 2019-06-12 | End: 2019-06-15 | Stop reason: HOSPADM

## 2019-06-12 RX ORDER — BISACODYL 10 MG
10 SUPPOSITORY, RECTAL RECTAL DAILY PRN
Status: DISCONTINUED | OUTPATIENT
Start: 2019-06-12 | End: 2019-06-15 | Stop reason: HOSPADM

## 2019-06-12 RX ORDER — TRAMADOL HYDROCHLORIDE 50 MG/1
50 TABLET ORAL NIGHTLY PRN
Status: DISCONTINUED | OUTPATIENT
Start: 2019-06-13 | End: 2019-06-15 | Stop reason: HOSPADM

## 2019-06-12 RX ORDER — ASPIRIN 81 MG/1
81 TABLET, CHEWABLE ORAL DAILY
Status: DISCONTINUED | OUTPATIENT
Start: 2019-06-13 | End: 2019-06-15 | Stop reason: HOSPADM

## 2019-06-12 RX ORDER — DIAZEPAM 5 MG/1
5 TABLET ORAL EVERY 12 HOURS PRN
Status: DISCONTINUED | OUTPATIENT
Start: 2019-06-12 | End: 2019-06-15 | Stop reason: HOSPADM

## 2019-06-12 RX ORDER — ALPRAZOLAM 0.25 MG/1
0.25 TABLET ORAL 3 TIMES DAILY PRN
Status: DISCONTINUED | OUTPATIENT
Start: 2019-06-12 | End: 2019-06-15 | Stop reason: HOSPADM

## 2019-06-12 RX ORDER — DEXTROSE AND SODIUM CHLORIDE 5; .45 G/100ML; G/100ML
INJECTION, SOLUTION INTRAVENOUS CONTINUOUS
Status: DISCONTINUED | OUTPATIENT
Start: 2019-06-12 | End: 2019-06-15 | Stop reason: HOSPADM

## 2019-06-12 RX ORDER — OXYBUTYNIN CHLORIDE 5 MG/1
5 TABLET, EXTENDED RELEASE ORAL DAILY
Status: DISCONTINUED | OUTPATIENT
Start: 2019-06-13 | End: 2019-06-15 | Stop reason: HOSPADM

## 2019-06-12 RX ORDER — CITALOPRAM 10 MG/1
10 TABLET ORAL DAILY
Status: DISCONTINUED | OUTPATIENT
Start: 2019-06-13 | End: 2019-06-15 | Stop reason: HOSPADM

## 2019-06-12 RX ORDER — ERGOCALCIFEROL 1.25 MG/1
50000 CAPSULE ORAL
Status: DISCONTINUED | OUTPATIENT
Start: 2019-06-26 | End: 2019-06-15 | Stop reason: HOSPADM

## 2019-06-12 RX ORDER — ACETAMINOPHEN 325 MG/1
650 TABLET ORAL EVERY 6 HOURS PRN
Status: DISCONTINUED | OUTPATIENT
Start: 2019-06-12 | End: 2019-06-15 | Stop reason: HOSPADM

## 2019-06-12 RX ADMIN — DIAZEPAM 5 MG: 5 TABLET ORAL at 23:03

## 2019-06-12 RX ADMIN — DEXTROSE AND SODIUM CHLORIDE: 5; 450 INJECTION, SOLUTION INTRAVENOUS at 23:20

## 2019-06-12 RX ADMIN — ATORVASTATIN CALCIUM 40 MG: 40 TABLET, FILM COATED ORAL at 23:03

## 2019-06-12 ASSESSMENT — HEART SCORE: ECG: 0

## 2019-06-12 NOTE — ED PROVIDER NOTES
[address] [place] [address]  eMERGENCY dEPARTMENT eNCOUnter        Pt Name: Emily Ochoa  MRN: [id number]  [place] 2/11/1930  Date of evaluation: 6/12/2019  Provider: Ann Marie Velez MD  PCP: Janae Huynh MD      [address] [place] [address]       Chief Complaint   Patient presents with    Altered Mental Status       HISTORY OFPRESENT ILLNESS   (Location/Symptom, Timing/Onset, Context/Setting, Quality, Duration, Modifying Factors,Severity)  Note limiting factors. Emily Ochoa is a 80 y.o. female       Brought in from a local nursing home with a change in her mental status. Patient had a stroke about a year ago affecting her left side. She normally does not speak very much. This is confirmed by family members at the bedside. She has had tremors that would come and go but only lasts a few seconds since the stroke. Today the report I have is that she was shaking in her eyes rolled to the back of her head and then she became unresponsive. She is prone to urinary tract infections. Other than that the history of present illness limited due to the patient being nonverbal.    Nursing Noteswere all reviewed and agreed with or any disagreements were addressed  in the HPI. REVIEW OF SYSTEMS    (2-9 systems for level 4, 10 or more for level 5)     Review of Systems   Unable to perform ROS: Patient nonverbal   Neurological: Positive for tremors.          PAST MEDICAL HISTORY     Past Medical History:   Diagnosis Date    Alzheimer disease     Alzheimer's dementia without behavioral disturbance 10/7/2018    Anxiety     Depression     Hypertension     Muscle weakness     Thyroid disease          SURGICAL HISTORY     Past Surgical History:   Procedure Laterality Date    THYROIDECTOMY, PARTIAL      parathyroid         CURRENTMEDICATIONS       Current Discharge Medication List      CONTINUE these medications which have NOT CHANGED    Details   acetaminophen (TYLENOL) 325 MG tablet Take 650 mg by mouth every 6 hours as needed for Pain      bisacodyl (DULCOLAX) 10 MG suppository Place 10 mg rectally daily as needed for Constipation (if MOM ineffective)      magnesium hydroxide (MILK OF MAGNESIA) 400 MG/5ML suspension Take by mouth daily as needed for Constipation      aspirin 81 MG chewable tablet Take 1 tablet by mouth daily  Qty: 30 tablet, Refills: 3      atorvastatin (LIPITOR) 40 MG tablet Take 1 tablet by mouth nightly  Qty: 30 tablet, Refills: 3      citalopram (CELEXA) 10 MG tablet Take 10 mg by mouth daily      oxybutynin (DITROPAN-XL) 5 MG extended release tablet Take 5 mg by mouth daily      polyethylene glycol (GLYCOLAX) powder Take 17 g by mouth daily      traMADol (ULTRAM) 50 MG tablet Take 50 mg by mouth nightly as needed for Pain. .      ergocalciferol (ERGOCALCIFEROL) 35520 UNITS capsule Take 50,000 Units by mouth every 14 days. atenolol (TENORMIN) 25 MG tablet Take 25 mg by mouth daily              ALLERGIES     Patient has no known allergies. FAMILY HISTORY     History reviewed. No pertinent family history. SOCIAL HISTORY       Social History     Socioeconomic History    Marital status:       Spouse name: None    Number of children: 6    Years of education: None    Highest education level: None   Occupational History    None   Social Needs    Financial resource strain: None    Food insecurity:     Worry: None     Inability: None    Transportation needs:     Medical: None     Non-medical: None   Tobacco Use    Smoking status: Never Smoker    Smokeless tobacco: Never Used   Substance and Sexual Activity    Alcohol use: No    Drug use: No    Sexual activity: Never   Lifestyle    Physical activity:     Days per week: None     Minutes per session: None    Stress: None   Relationships    Social connections:     Talks on phone: None     Gets together: None     Attends Ep[employer] service: None     Active member of club or organization: None     Attends meetings of clubs or organizations: None     Relationship status: None    Intimate partner violence:     Fear of current or ex partner: None     Emotionally abused: None     Physically abused: None     Forced sexual activity: None   Other Topics Concern    None   Social History Narrative    None       SCREENINGS      Heart Score for chest pain patients  History: Slightly Suspicious  ECG: Normal  Patient Age: > 65 years  *Risk factors for Atherosclerotic disease: Hypertension  Risk Factors: 1 or 2 risk factors  Troponin: > 3X normal limit  Heart Score Total: 5      PHYSICAL EXAM    (up to 7 for level 4, 8 or more for level 5)     ED Triage Vitals [06/12/19 1500]   BP Temp Temp src Pulse Resp SpO2 Height Weight   124/62 98.8 °F (37.1 °C) -- 62 -- 96 % -- 165 lb 12.6 oz (75.2 kg)      weight is 165 lb 12.6 oz (75.2 kg). Her oral temperature is 98.8 °F (37.1 °C). Her blood pressure is 132/67 and her pulse is 65. Her respiration is 16 and oxygen saturation is 96%. Physical Exam   Constitutional: She appears well-developed and well-nourished. Elderly female resting in bed. She is holding her left arm across her abdomen. She has both knees flexed. She is not demonstrating any respiratory distress. HENT:   Head: Normocephalic and atraumatic. Right Ear: External ear normal.   Left Ear: External ear normal.   Eyes: Pupils are equal, round, and reactive to light. Right eye exhibits no discharge. Left eye exhibits no discharge. No scleral icterus. Bilateral arcus senilis   Neck: Normal range of motion. No JVD present. No tracheal deviation present. No thyromegaly present. Cardiovascular: Normal rate and regular rhythm. Exam reveals no gallop and no friction rub. No murmur heard. Pulmonary/Chest: Effort normal. No stridor. No respiratory distress. She has decreased breath sounds. She has no wheezes. She has no rales. Abdominal: Soft. She exhibits no distension. There is no tenderness. There is no rebound and no guarding. Musculoskeletal: She exhibits no edema or tenderness. Neurological:   Patient is awake. Eyes are open. She does not follow commands. She resists movements of all 4 extremities. No obvious facial drooping. She will mumble and occasional word but nothing that is understandable. Skin: Skin is warm and dry. No rash (On exposed body surfaces) noted. She is not diaphoretic. Psychiatric: She has a normal mood and affect.  Her behavior is normal. Thought content normal.       DIAGNOSTIC RESULTS   LABS:    Results for orders placed or performed during the hospital encounter of 06/12/19   CBC Auto Differential   Result Value Ref Range    WBC 7.0 4.0 - 11.0 K/uL    RBC 3.87 (L) 4.00 - 5.20 M/uL    Hemoglobin 12.4 12.0 - 16.0 g/dL    Hematocrit 36.1 36.0 - 48.0 %    MCV 93.2 80.0 - 100.0 fL    MCH 31.9 26.0 - 34.0 pg    MCHC 34.2 31.0 - 36.0 g/dL    RDW 13.9 12.4 - 15.4 %    Platelets 075 596 - 065 K/uL    MPV 6.9 5.0 - 10.5 fL    Neutrophils % 63.2 %    Lymphocytes % 23.9 %    Monocytes % 10.0 %    Eosinophils % 1.9 %    Basophils % 1.0 %    Neutrophils # 4.4 1.7 - 7.7 K/uL    Lymphocytes # 1.7 1.0 - 5.1 K/uL    Monocytes # 0.7 0.0 - 1.3 K/uL    Eosinophils # 0.1 0.0 - 0.6 K/uL    Basophils # 0.1 0.0 - 0.2 K/uL   Comprehensive Metabolic Panel w/ Reflex to MG   Result Value Ref Range    Sodium 141 136 - 145 mmol/L    Potassium reflex Magnesium 4.5 3.5 - 5.1 mmol/L    Chloride 107 99 - 110 mmol/L    CO2 23 21 - 32 mmol/L    Anion Gap 11 3 - 16    Glucose 104 (H) 70 - 99 mg/dL    BUN 23 (H) 7 - 20 mg/dL    CREATININE 1.0 0.6 - 1.2 mg/dL    GFR Non-African American 52 (A) >60    GFR African American >60 >60    Calcium 9.7 8.3 - 10.6 mg/dL    Total Protein 7.7 6.4 - 8.2 g/dL    Alb 3.7 3.4 - 5.0 g/dL    Albumin/Globulin Ratio 0.9 (L) 1.1 - 2.2    Total Bilirubin 0.4 0.0 - 1.0 mg/dL    Alkaline Phosphatase 63 40 - 129 U/L    ALT 17 10 - 40 U/L    AST 16 15 - 37 U/L    Globulin 4.0 g/dL   Troponin   Result Value Ref Range Troponin 0.14 (H) <0.01 ng/mL   Urinalysis Reflex to Culture   Result Value Ref Range    Color, UA YELLOW Straw/Yellow    Clarity, UA CLOUDY (A) Clear    Glucose, Ur Negative Negative mg/dL    Bilirubin Urine Negative Negative    Ketones, Urine Negative Negative mg/dL    Specific Gravity, UA 1.020 1.005 - 1.030    Blood, Urine Negative Negative    pH, UA 6.5 5.0 - 8.0    Protein, UA 30 (A) Negative mg/dL    Urobilinogen, Urine 1.0 <2.0 E.U./dL    Nitrite, Urine Negative Negative    Leukocyte Esterase, Urine TRACE (A) Negative    Microscopic Examination YES     Urine Reflex to Culture Yes     Urine Type Not Specified    Microscopic Urinalysis   Result Value Ref Range    Bacteria, UA 3+ (A) /HPF    Hyaline Casts, UA 1 0 - 8 /LPF    WBC, UA 9 (H) 0 - 5 /HPF    RBC, UA 1 0 - 4 /HPF    Epi Cells 2 0 - 5 /HPF   EKG 12 Lead   Result Value Ref Range    Ventricular Rate 65 BPM    Atrial Rate 65 BPM    P-R Interval 212 ms    QRS Duration 70 ms    Q-T Interval 416 ms    QTc Calculation (Bazett) 432 ms    P Axis 87 degrees    R Axis 33 degrees    T Axis 51 degrees    Diagnosis       Sinus rhythm with prolonged AV conductionOtherwise normal ECGWhen compared with ECG of 07-OCT-2018 09:30,Premature atrial complexes are no longer PresentPR interval has increasedConfirmed by Shanna, [address] [place] [address] ([id number]) on 6/12/2019 4:24:50 PM       All other labs were within normal range or not returned as of this dictation. EKG: All EKG's are interpreted by the Emergency Department Physician who either signs orCo-signs this chart in the absence of a cardiologist.    EKG visualized primarily interpreted by myself shows sinus rhythm at a rate of 65. There is a first-degree AV block. Otherwise ST-T wave intervals are all unremarkable.     RADIOLOGY:   Non-plain film images such as CT, Ultrasound and MRI are read by the radiologist. Plain radiographic images are visualized and preliminarily interpreted by the  EDProvider with the below findings:    Ct Head Wo Contrast    Result Date: 6/12/2019  EXAMINATION: CT OF THE HEAD WITHOUT CONTRAST  6/12/2019 3:30 pm TECHNIQUE: CT of the head was performed without the administration of intravenous contrast. Dose modulation, iterative reconstruction, and/or weight based adjustment of the mA/kV was utilized to reduce the radiation dose to as low as reasonably achievable. COMPARISON: 10/11/2018 HISTORY: ORDERING SYSTEM PROVIDED HISTORY: Hx of CVA, today possible seizure TECHNOLOGIST PROVIDED HISTORY: Has a \"code stroke\" or \"stroke alert\" been called? ->No FINDINGS: BRAIN/VENTRICLES: There is no acute intracranial hemorrhage, mass effect or midline shift. No abnormal extra-axial fluid collection. Remote segmental infarct is demonstrated within the lateral aspect of the right frontal lobe. There is prominence of the ventricles and sulci due to global parenchymal volume loss. There are nonspecific areas of hypoattenuation within the periventricular and subcortical white matter, which likely represent chronic microvascular ischemic change. ORBITS: The visualized portion of the orbits demonstrate no acute abnormality. SINUSES: The visualized paranasal sinuses and mastoid air cells demonstrate no acute abnormality. SOFT TISSUES/SKULL: No acute abnormality of the visualized skull or soft tissues. No acute intracranial abnormality. Xr Chest Portable    Result Date: 6/12/2019  EXAMINATION: ONE XRAY VIEW OF THE CHEST 6/12/2019 3:43 pm COMPARISON: 12/26/2014 HISTORY: ORDERING SYSTEM PROVIDED HISTORY: possible seizure TECHNOLOGIST PROVIDED HISTORY: Reason for exam:->possible seizure Ordering Physician Provided Reason for Exam: possible seizure Acuity: Acute Type of Exam: Initial FINDINGS: Heart size and pulmonary vessels within normal limits. Low lung volumes resulting in basilar vascular crowding. No infiltrate or edema.  Costophrenic angles sharp     No active cardiopulmonary disease           PROCEDURES   Unless otherwise noted below, none     Procedures    CRITICAL CARE TIME   N/A    CONSULTS:  IP CONSULT TO PRIMARY CARE PROVIDER    EMERGENCY DEPARTMENT COURSE and DIFFERENTIAL DIAGNOSIS/MDM:   Vitals:    Vitals:    06/12/19 1645 06/12/19 1803 06/12/19 1835 06/12/19 1910   BP: (!) 127/53 (!) 127/58 (!) 203/183 132/67   Pulse: 60 62 62 65   Resp:  15  16   Temp:  98.5 °F (36.9 °C)  98.8 °F (37.1 °C)   TempSrc:  Oral  Oral   SpO2: 97%   96%   Weight:           Patient was given the following medications:  Medications   dextrose 5 % and 0.45 % sodium chloride infusion ( Intravenous New Bag 6/12/19 2320)   acetaminophen (TYLENOL) tablet 650 mg (has no administration in time range)   aspirin chewable tablet 81 mg (has no administration in time range)   atenolol (TENORMIN) tablet 25 mg (has no administration in time range)   atorvastatin (LIPITOR) tablet 40 mg (40 mg Oral Given 6/12/19 2303)   bisacodyl (DULCOLAX) suppository 10 mg (has no administration in time range)   citalopram (CELEXA) tablet 10 mg (has no administration in time range)   vitamin D (ERGOCALCIFEROL) capsule 50,000 Units (has no administration in time range)   magnesium hydroxide (MILK OF MAGNESIA) 400 MG/5ML suspension 30 mL (has no administration in time range)   oxybutynin (DITROPAN-XL) extended release tablet 5 mg (has no administration in time range)   polyethylene glycol (GLYCOLAX) powder 17 g (has no administration in time range)   traMADol (ULTRAM) tablet 50 mg (has no administration in time range)   ALPRAZolam (XANAX) tablet 0.25 mg (has no administration in time range)   diazepam (VALIUM) tablet 5 mg (5 mg Oral Given 6/12/19 2303)       Stable      FINAL IMPRESSION      1. Altered mental status, unspecified altered mental status type    2.  Elevated troponin          DISPOSITION/PLAN    DISPOSITION        PATIENT REFERRED TO:  MD Eladio Kunz [phone]            DISCHARGE MEDICATIONS:  Current Discharge Medication List          DISCONTINUED MEDICATIONS:  Current Discharge Medication List      STOP taking these medications       ALPRAZolam (XANAX) 0.25 MG tablet Comments:   Reason for Stopping:         diazepam (VALIUM) 5 MG tablet Comments:   Reason for Stopping:                      (Please note that portions of this note were completed with a voice recognition program.  Efforts were made to editthe dictations but occasionally words are mis-transcribed.)    Mariusz Fernandez MD (electronically signed)            Mariusz Fernandez MD  06/12/19 6020

## 2019-06-12 NOTE — ED NOTES
Pharmacy Medication Reconciliation Note     List of medications patient is currently taking is complete. Allergies:  Patient has no known allergies. Source of information:   1. [facility]CF med list  2. nurse    Notes regarding home medications:   1.  Patient likely did receive her AM medications per family - call out to [facility] to verify    Denies taking any other OTC or herbal medications    Daysi Akers PharmD, BCPS  6/12/2019  6:14 PM

## 2019-06-13 LAB — TROPONIN: 0.02 NG/ML

## 2019-06-13 PROCEDURE — 2580000003 HC RX 258: Performed by: SPECIALIST

## 2019-06-13 PROCEDURE — 84484 ASSAY OF TROPONIN QUANT: CPT

## 2019-06-13 PROCEDURE — 99222 1ST HOSP IP/OBS MODERATE 55: CPT | Performed by: INTERNAL MEDICINE

## 2019-06-13 PROCEDURE — 6370000000 HC RX 637 (ALT 250 FOR IP)

## 2019-06-13 PROCEDURE — 6370000000 HC RX 637 (ALT 250 FOR IP): Performed by: SPECIALIST

## 2019-06-13 PROCEDURE — 6360000002 HC RX W HCPCS: Performed by: SPECIALIST

## 2019-06-13 PROCEDURE — 96361 HYDRATE IV INFUSION ADD-ON: CPT

## 2019-06-13 PROCEDURE — 96372 THER/PROPH/DIAG INJ SC/IM: CPT

## 2019-06-13 PROCEDURE — G0378 HOSPITAL OBSERVATION PER HR: HCPCS

## 2019-06-13 PROCEDURE — 96365 THER/PROPH/DIAG IV INF INIT: CPT

## 2019-06-13 PROCEDURE — 1200000000 HC SEMI PRIVATE

## 2019-06-13 PROCEDURE — 36415 COLL VENOUS BLD VENIPUNCTURE: CPT

## 2019-06-13 RX ORDER — POLYETHYLENE GLYCOL 3350 17 G/17G
17 POWDER, FOR SOLUTION ORAL DAILY
Status: DISCONTINUED | OUTPATIENT
Start: 2019-06-13 | End: 2019-06-15 | Stop reason: HOSPADM

## 2019-06-13 RX ADMIN — ASPIRIN 81 MG 81 MG: 81 TABLET ORAL at 09:11

## 2019-06-13 RX ADMIN — POLYETHYLENE GLYCOL 3350 17 G: 17 POWDER, FOR SOLUTION ORAL at 09:12

## 2019-06-13 RX ADMIN — CEFTRIAXONE 1 G: 1 INJECTION, POWDER, FOR SOLUTION INTRAMUSCULAR; INTRAVENOUS at 09:12

## 2019-06-13 RX ADMIN — OXYBUTYNIN CHLORIDE 5 MG: 5 TABLET, EXTENDED RELEASE ORAL at 09:11

## 2019-06-13 RX ADMIN — ALPRAZOLAM 0.25 MG: 0.25 TABLET ORAL at 03:18

## 2019-06-13 RX ADMIN — CITALOPRAM HYDROBROMIDE 10 MG: 10 TABLET ORAL at 09:11

## 2019-06-13 RX ADMIN — ATORVASTATIN CALCIUM 40 MG: 40 TABLET, FILM COATED ORAL at 20:18

## 2019-06-13 RX ADMIN — ALPRAZOLAM 0.25 MG: 0.25 TABLET ORAL at 20:18

## 2019-06-13 RX ADMIN — ENOXAPARIN SODIUM 40 MG: 40 INJECTION SUBCUTANEOUS at 09:11

## 2019-06-13 RX ADMIN — ATENOLOL 25 MG: 25 TABLET ORAL at 09:11

## 2019-06-13 ASSESSMENT — PAIN SCALES - WONG BAKER
WONGBAKER_NUMERICALRESPONSE: 0

## 2019-06-13 NOTE — PROGRESS NOTES
D: Pt. With some restlessness and anxiety  A: medicated with Valium 5mg as ordered @ 8149 last night  R: resting quietly afterwards  D: again with some anxiety and restlessness this morning  A: medicated with Xanax . 25mg as ordered @ 6853

## 2019-06-13 NOTE — CONSULTS
Referring Physician: Dr. Elisabeth Babinski  Reason for Consultation: Elevated troponin  Chief Complaint: Patient unable to provide history. Subjective:   History of Present Illness: Snow Lomas is a 80 y.o. patient who presented to the hospital from her nursing home with altered mental status. Patient is unable to provide any history. No family is present bedside. History obtain from chart review. Reason for consultation is elevated troponin. Previous admission patient was comfort care only. She is aphasic from a prior CVA. She does not appear to be in any distress or pain. She is eating breakfast with the aid of a nurse. She does not follow simple commands. Past Medical History:   has a past medical history of Alzheimer disease, Alzheimer's dementia without behavioral disturbance, Anxiety, Depression, Hypertension, Muscle weakness, and Thyroid disease. Surgical History:   has a past surgical history that includes Thyroidectomy, partial.     Social History:   reports that she has never smoked. She has never used smokeless tobacco. She reports that she does not drink alcohol or use drugs. Family History:  Unable to obtain secondary to medical condition. Home Medications:  Were reviewed and are listed in nursing record and/or below  Prior to Admission medications    Medication Sig Start Date End Date Taking?  Authorizing Provider   acetaminophen (TYLENOL) 325 MG tablet Take 650 mg by mouth every 6 hours as needed for Pain   Yes Historical Provider, MD   bisacodyl (DULCOLAX) 10 MG suppository Place 10 mg rectally daily as needed for Constipation (if MOM ineffective)   Yes Historical Provider, MD   magnesium hydroxide (MILK OF MAGNESIA) 400 MG/5ML suspension Take by mouth daily as needed for Constipation   Yes Historical Provider, MD   aspirin 81 MG chewable tablet Take 1 tablet by mouth daily 10/13/18  Yes Yari Clark MD   atorvastatin (LIPITOR) 40 MG tablet Take 1 tablet by mouth nightly 10/12/18  Yes Yari Adair MD   citalopram (CELEXA) 10 MG tablet Take 10 mg by mouth daily   Yes Historical Provider, MD   oxybutynin (DITROPAN-XL) 5 MG extended release tablet Take 5 mg by mouth daily   Yes Historical Provider, MD   polyethylene glycol (GLYCOLAX) powder Take 17 g by mouth daily   Yes Historical Provider, MD   traMADol (ULTRAM) 50 MG tablet Take 50 mg by mouth nightly as needed for Pain. .   Yes Historical Provider, MD   ergocalciferol (ERGOCALCIFEROL) 81549 UNITS capsule Take 50,000 Units by mouth every 14 days. Yes Historical Provider, MD   atenolol (TENORMIN) 25 MG tablet Take 25 mg by mouth daily    Yes Historical Provider, MD        CURRENT Medications:    polyethylene glycol (GLYCOLAX) packet 17 g Daily   cefTRIAXone (ROCEPHIN) 1 g IVPB in 50 mL D5W minibag Q24H   enoxaparin (LOVENOX) injection 40 mg Daily   dextrose 5 % and 0.45 % sodium chloride infusion Continuous   acetaminophen (TYLENOL) tablet 650 mg Q6H PRN   aspirin chewable tablet 81 mg Daily   atenolol (TENORMIN) tablet 25 mg Daily   atorvastatin (LIPITOR) tablet 40 mg Nightly   bisacodyl (DULCOLAX) suppository 10 mg Daily PRN   citalopram (CELEXA) tablet 10 mg Daily   [START ON 6/26/2019] vitamin D (ERGOCALCIFEROL) capsule 50,000 Units Q14 Days   magnesium hydroxide (MILK OF MAGNESIA) 400 MG/5ML suspension 30 mL Daily PRN   oxybutynin (DITROPAN-XL) extended release tablet 5 mg Daily   traMADol (ULTRAM) tablet 50 mg Nightly PRN   ALPRAZolam (XANAX) tablet 0.25 mg TID PRN   diazepam (VALIUM) tablet 5 mg Q12H PRN     Allergies:  Patient has no known allergies. Review of Systems:   Unable to obtain secondary to medical condition. Objective:   PHYSICAL EXAM:    Vitals:    06/13/19 0539   BP: 130/71   Pulse: 62   Resp: 16   Temp: 98 °F (36.7 °C)   SpO2: 97%    Weight: 161 lb 6 oz (73.2 kg)       General Appearance:  Alert, no distress, elderly. Head:  Normocephalic, without obvious abnormality, atraumatic.    Eyes: Pupils equal and round. No scleral icterus. Mouth: Moist mucosa, no pharyngeal erythema. Nose: Nares normal. No drainage or sinus tenderness. Neck: Supple, symmetrical, trachea midline. No adenopathy. No tenderness/mass/nodules. No carotid bruit or elevated JVD. Lungs:   Clear to auscultation bilaterally, respirations unlabored. No wheeze, rales, or rhonchi. Chest Wall:  No tenderness or deformity. Heart:  Regular rate. S1/S2 normal. No murmur, rub, or gallop. Abdomen:   Soft, non-tender, bowel sounds active. Musculoskeletal: +muscle wasting. No digital clubbing. Extremities: No cyanosis or edema. Pulses: 2+ radial and carotid pulses, symmetric. Skin: No rashes or lesions. Pysch: Normal mood and affect. Alert and oriented x 0. Neurologic: +weakness. Does not follow simple commands. Aphasic. Labs     CBC: Lab Results   Component Value Date    WBC 7.0 2019    RBC 3.87 2019    HGB 12.4 2019    HCT 36.1 2019    MCV 93.2 2019    RDW 13.9 2019     2019     CMP:  Lab Results   Component Value Date     2019    K 4.5 2019     2019    CO2 23 2019    BUN 23 2019    CREATININE 1.0 2019    GFRAA >60 2019    AGRATIO 0.9 2019    LABGLOM 52 2019    GLUCOSE 104 2019    PROT 7.7 2019    CALCIUM 9.7 2019    BILITOT 0.4 2019    ALKPHOS 63 2019    AST 16 2019    ALT 17 2019     PT/INR:  No results found for: PTINR  HgBA1c:  Lab Results   Component Value Date    LABA1C 5.9 10/07/2018     Lab Results   Component Value Date    CKTOTAL 191 2014    TROPONINI 0.14 (H) 2019       Cardiac Data     EK19 Sinus rhythm with 1st degree AV block. Otherwise normal.     Echo: 10/2018  Very limited study due to patient position and patient being combative.   Normal left ventricle size, wall thickness, and systolic function with an  estimated ejection fraction of 60-65%. In limited views, there appears to be mild to moderate mitral regurgitation. Mild aortic regurgitation. The left atrium is mildly dilated. Mild-to-moderate tricuspid regurgitation with RVSP of greater than 60 mmHg,  consistent with at least moderate pulmonary hypertension. Assessment and Plan   1) NSTEMI. Uncertain if a Type I or II event but regardless of etiology patient is not a candidate for cardiac testing or interventions. Patient does not appear to be in any distress. Suggest symptom based therapies only. 2) History of CVA. Continue ASA and statin. 3) Acute encephalopathy and dementia. Will defer work-up and treatment to primary team.     4) Code status. Previously comfort care only and currently does not have a code status order. Will defer readdressing issue to the primary team but continuing comfort care only seems most appropriate. Thank you for allowing us to participate in the care of [facility]. Will sign off. Call with questions. Arnie Taylor.  Sandra Snyder, [address] [place] [address] Road  6/13/2019 9:03 AM

## 2019-06-13 NOTE — H&P
[address]                              HISTORY AND PHYSICAL    PATIENT NAME: Yesica Mathew                   :        1930  MED REC NO:   [id number]                          ROOM:       6972  ACCOUNT NO:   [de-identified]                           ADMIT DATE: 2019  PROVIDER:     Bal Malave MD    ATTENDING PHYSICIAN:  Bal Malave MD    CHIEF COMPLAINT:  Mental status change, shakiness. HISTORY OF PRESENT ILLNESS:  This 43-year-old female patient lives in  [address] [place] [address]. I know her, following her on a  monthly basis. The patient's son is at the bedside, is telling that she  had some kind of shakiness and change in the mental status. The patient  was found to have some urinary tract infection and mildly elevated  cardiac enzyme. We will keep the patient in observation status, get the  Cardiology consult, and find out what is causing the shakiness. The  patient had CT scan which showed no new stroke. Chest x-ray was  negative. Urine was positive for 3+ bacteria. In the brain, there was  a nonspecific hyperattenuation within the periventricular and  subcortical white matter with chronic microvascular ischemic changes. PAST MEDICAL HISTORY:  Significant for dementia, history of fall, right  hip fracture, history of DVT, hypertension, some old CEA. The patient  also has anxiety and some depression. PAST SURGICAL HISTORY:  Significant for thyroid surgery. ALLERGIES:  No known allergies. CURRENT MEDICATIONS:  See the reconciliation sheet.   Current Facility-Administered Medications   Medication Dose Route Frequency Provider Last Rate Last Dose    polyethylene glycol (GLYCOLAX) packet 17 g  17 g Oral Daily Bal Malave MD   17 g at 19 0912    cefTRIAXone (ROCEPHIN) 1 g IVPB in 50 mL D5W minibag  1 g Intravenous Q24H Bal Malave MD   Stopped blood pressure was 124/62, respiratory rate  was 62, temperature 98.8. The patient weighed 165 pounds. Saturation  was 96% on room air. GENERAL:  The patient is well developed, well nourished, sleepy but  easily arousable, not in apparent distress. SKIN:  Warm and dry. HEENT:  Head:  Normocephalic, atraumatic. Pupils are both sides equal,  reactive to light and accommodation. Ears, Nose, and Throat: Within  normal limits. Mucous membranes are moist.  NECK:  Supple. No JVD. No lymphadenopathy. No thyromegaly. LUNGS:  Clear bilaterally. No wheezing. HEART:  S1, S2.  Regular rhythm. ABDOMEN:  Soft. Bowel sounds present. No mass. No hepatosplenomegaly. EXTREMITIES:  No edema. No cyanosis. No clubbing. LABORATORY STUDIES:  WBC count was 7.0, hemoglobin 12.4, hematocrit  36.1, platelet count 410. Sodium was 141, potassium 4.5, chloride 107,  CO2 of 23, BUN 23, creatinine 1.0, glucose 104. LFTs are normal.   Troponin 0.14. CT of the head:  No acute intracranial abnormality. There are some  chronic microvascular ischemic changes in the periventricular white  matter. Chest x-ray is negative. EKG showed normal sinus rhythm with prolonged AV. Mildly elevated troponin. ASSESSMENT:  Mental status change, elevated troponin, UTI, chronic  microvascular changes, Alzheimer dementia, depression, anxiety, history  of right hip fracture, history of DVT. PLAN:  Resume the nursing home medication. DVT prophylaxis. Start  antibiotic therapy. Urine culture and sensitivity. Cardiology consult. If the patient qualifies for admission, we will change it. Right now,  the patient is in observation status. I spent more than 30 minutes on face-to-face evaluation with the  patient. I discussed the management and findings with the patient and  nursing staff.         Becky Damon MD    D: 06/13/2019 6:39:35       T: 06/13/2019 10:39:31     [email or web address]  Job#: [id number]     Doc#: [id number]    CC:

## 2019-06-13 NOTE — CARE COORDINATION
Pt is a resident of the Home at [facility]. Plan to arrange transfer upon eventual dc order.     Electronically signed by CLAUDIO Barber on 6/13/2019 at 10:54 AM

## 2019-06-13 NOTE — H&P
H & P dictated  211 [id number]  MS change  UTI, HTN  Elevated cardiac enzymes  Alzheimer dementia  RELL, Deppresion,  Hx of R hip Fx, DVT\  Antibiotic therapy, Urine, Cx,  Resume NH meds,cardiology consult,  Pt is on observation status if qualified for admission will change it.   Dr Dequan Londono

## 2019-06-14 VITALS
RESPIRATION RATE: 18 BRPM | BODY MASS INDEX: 26.96 KG/M2 | HEART RATE: 65 BPM | HEIGHT: 65 IN | WEIGHT: 161.82 LBS | TEMPERATURE: 98.4 F | SYSTOLIC BLOOD PRESSURE: 177 MMHG | OXYGEN SATURATION: 97 % | DIASTOLIC BLOOD PRESSURE: 87 MMHG

## 2019-06-14 LAB — URINE CULTURE, ROUTINE: NORMAL

## 2019-06-14 PROCEDURE — 2580000003 HC RX 258: Performed by: SPECIALIST

## 2019-06-14 PROCEDURE — 6370000000 HC RX 637 (ALT 250 FOR IP): Performed by: SPECIALIST

## 2019-06-14 PROCEDURE — 6360000002 HC RX W HCPCS: Performed by: SPECIALIST

## 2019-06-14 PROCEDURE — 6370000000 HC RX 637 (ALT 250 FOR IP)

## 2019-06-14 RX ORDER — ACETAMINOPHEN 325 MG/1
650 TABLET ORAL EVERY 6 HOURS PRN
Qty: 120 TABLET | Refills: 3 | Status: SHIPPED | OUTPATIENT
Start: 2019-06-14

## 2019-06-14 RX ORDER — DIAZEPAM 5 MG/1
5 TABLET ORAL EVERY 12 HOURS PRN
Qty: 20 TABLET | Refills: 1 | Status: SHIPPED | OUTPATIENT
Start: 2019-06-14 | End: 2019-06-24

## 2019-06-14 RX ORDER — ALPRAZOLAM 0.25 MG/1
0.25 TABLET ORAL 3 TIMES DAILY PRN
Qty: 40 TABLET | Refills: 1 | Status: SHIPPED | OUTPATIENT
Start: 2019-06-14 | End: 2019-07-04

## 2019-06-14 RX ADMIN — ATENOLOL 25 MG: 25 TABLET ORAL at 09:54

## 2019-06-14 RX ADMIN — ASPIRIN 81 MG 81 MG: 81 TABLET ORAL at 09:54

## 2019-06-14 RX ADMIN — CITALOPRAM HYDROBROMIDE 10 MG: 10 TABLET ORAL at 09:54

## 2019-06-14 RX ADMIN — CEFTRIAXONE 1 G: 1 INJECTION, POWDER, FOR SOLUTION INTRAMUSCULAR; INTRAVENOUS at 09:54

## 2019-06-14 RX ADMIN — ATORVASTATIN CALCIUM 40 MG: 40 TABLET, FILM COATED ORAL at 20:39

## 2019-06-14 RX ADMIN — DEXTROSE AND SODIUM CHLORIDE: 5; 450 INJECTION, SOLUTION INTRAVENOUS at 00:55

## 2019-06-14 RX ADMIN — DIAZEPAM 5 MG: 5 TABLET ORAL at 00:50

## 2019-06-14 RX ADMIN — ENOXAPARIN SODIUM 40 MG: 40 INJECTION SUBCUTANEOUS at 09:54

## 2019-06-14 RX ADMIN — OXYBUTYNIN CHLORIDE 5 MG: 5 TABLET, EXTENDED RELEASE ORAL at 09:54

## 2019-06-14 RX ADMIN — POLYETHYLENE GLYCOL 3350 17 G: 17 POWDER, FOR SOLUTION ORAL at 09:54

## 2019-06-14 ASSESSMENT — PAIN SCALES - WONG BAKER
WONGBAKER_NUMERICALRESPONSE: 0

## 2019-06-14 NOTE — PROGRESS NOTES
Xanax and Valium scripts not signed. Pt leaving at 9:30 pm. MD called and made aware. He states, \"Discard scripts, Pt is already on them at the nursing home. \" Educated MD that scripts need to be signed before going to nursing home in order for them to be refilled. MD states, \"Just discard them and I will call them and figure it out in the morning. \" Will monitor.

## 2019-06-14 NOTE — DISCHARGE SUMMARY
[address] [address]                               DISCHARGE SUMMARY    PATIENT NAME: Mine Pantoja                   :        1930  MED REC NO:   [id number]                          ROOM:       56  ACCOUNT NO:   [de-identified]                           ADMIT DATE: 2019  PROVIDER:     Luis Enrique Le MD                  DISCHARGE DATE:    DATE OF DISCHARGE:  2019    DISCHARGE DIAGNOSES:  Mental status change, non-ST MI, dementia,  Alzheimer disease, elevated cardiac enzyme, possible UTI, history of  CVA, history of hip fracture, DVT, anxiety, and depression. DISCHARGE SUMMARY:  This 80-year-old female patient was living in an  extended care facility and nursing staff noticed some mental status  change and some shakiness. The patient was brought to the Emergency  Room for further evaluation. During the ER visit, the patient's cardiac  enzyme was elevated. The patient also had some 3+ bacteria in the  urine. CT scan of the head was negative for new stroke but had some  other microvascular changes in the brain. The patient was admitted for  observation status. The patient was evaluated by Cardiology and was  found to have a non-ST MI. It is a small MI. The patient remained  hemodynamically stable. The patient's mental status change has slowly  improved but has underlying dementia, Alzheimer type, and some anxiety. Results were discussed with family members. Cardiology said the patient  is medically stable, okay to discharge back to the extended care  facility and we will follow the patient over there. The patient's urine  culture does not grow any bacteria. CONDITION AT DISCHARGE:  Stable. COMPLICATIONS:  None. DISCHARGE MEDICATIONS:  See the reconciliation sheet. The patient will follow up with the appointed attending at the nursing  facility.   Dr. Tim Whitman will follow her over there.    I spent more than 30 minutes with the discharge instructions and  paperwork.         Becky Damon MD    D: 06/14/2019 16:18:54       T: 06/14/2019 18:08:35     [email or web address]  Job#: [id number]     Doc#: [id number]    CC:  <>

## 2019-06-14 NOTE — DISCHARGE INSTR - DIET

## 2019-06-14 NOTE — DISCHARGE INSTR - COC
Continuity of Care Form    Patient Name: Kumar Alanis   :  1930  MRN:  [id number]    Admit date:  2019  Discharge date:  19    Code Status Order: Prior   Advance Directives:   [facility] Documentation     Date/Time Healthcare Directive Type of Healthcare Directive Copy in [address] Agent's Name Healthcare Agent's Phone Number    195  Yes, patient has an advance directive for healthcare treatment  Durable power of  for health care;Living will  No, copy requested from family  Healthcare power of   James Day  [phone]          Admitting Physician:  Kimani Ritter MD  PCP: Kimani Ritter MD    Discharging Nurse: [facility] Unit/Room#: H9Z-2022/5361-71  Discharging Unit Phone Number: [phone]    Emergency Contact:   Extended Emergency Contact Information  Primary Emergency Contact: Yvon Desai  Address: [address] Phone: [phone]  Mobile Phone: [phone]  Relation: Child  Secondary Emergency Contact: Saniya Zamorano  [place] Phone: [phone]  Relation: Child    Past Surgical History:  Past Surgical History:   Procedure Laterality Date    THYROIDECTOMY, PARTIAL      parathyroid       Immunization History:   Immunization History   Administered Date(s) Administered    Influenza Vaccine, unspecified formulation 2004    Pneumococcal 13-valent Conjugate (Fbxhvzx50) 2016    Pneumococcal Polysaccharide ([id number]) 2006    Td, unspecified formulation 1995    Tetanus 1995    Varicella (Varivax) 09/15/2011       Active Problems:  Patient Active Problem List   Diagnosis Code    Femur fracture ([facility] [address] S72.90XA    Acute CVA (cerebrovascular accident) ([facility] [address] I63.9    Essential hypertension I10    Estuardo-neglect of left side R41.4    Alzheimer's dementia without behavioral disturbance G30.9, F02.80    Mental confusion R41.0    NSTEMI (non-ST elevated myocardial infarction) ([facility]) I21.4    Acute encephalopathy G93.40    History of CVA (cerebrovascular accident) Z86.73       Isolation/Infection:   Isolation          No Isolation            Nurse Assessment:  Last Vital Signs: /65   Pulse 57   Temp 96.6 °F (35.9 °C) (Axillary)   Resp 16   Ht 5' 5\" (1.651 m)   Wt 161 lb 13.1 oz (73.4 kg)   SpO2 94%   BMI 26.93 kg/m²     Last documented pain score (0-10 scale):    Last Weight:   Wt Readings from Last 1 Encounters:   06/14/19 161 lb 13.1 oz (73.4 kg)     Mental Status:  BLAS    IV Access:  - None    Nursing Mobility/ADLs:  Walking   Dependent  Transfer  Dependent  Bathing  Dependent  Dressing  Dependent  Toileting  Dependent  Feeding  Assisted  Med Admin  Assisted  Med Delivery   crushed in apple sauce    Wound Care Documentation and Therapy:  Incision 12/27/14 Hip Right (Active)   Number of days: 1630        Elimination:  Continence:   · Bowel: No  · Bladder: No  Urinary Catheter: None   Colostomy/Ileostomy/Ileal Conduit: No       Date of Last BM:     Intake/Output Summary (Last 24 hours) at 6/14/2019 1516  Last data filed at 6/14/2019 1231  Gross per 24 hour   Intake 240 ml   Output 1450 ml   Net -1210 ml     I/O last 3 completed shifts: In: 240 [P.O.:240]  Out: 1450 [Urine:1450]    Safety Concerns: At Risk for Falls and Aspiration Risk    Impairments/Disabilities:      Speech    Nutrition Therapy:  Current Nutrition Therapy:   - Oral Diet:  Dysphagia 1 pureed    Routes of Feeding: Oral  Liquids: Nectar Thick Liquids  Daily Fluid Restriction: no  Last Modified Barium Swallow with Video (Video Swallowing Test): not done    Treatments at the Time of Hospital Discharge:   Respiratory Treatments:   Oxygen Therapy:  is not on home oxygen therapy.   Ventilator:    - No ventilator support    Rehab Therapies: ***  Weight Bearing Status/Restrictions: No weight bearing restirctions  Other Medical Equipment (for information only, NOT a DME order):  hospital bed  Other Treatments:     Patient's personal belongings (please select all that are sent with patient):  None    RN SIGNATURE:  Electronically signed by Sapna Longoria RN on 6/14/19 at 3:18 PM    CASE MANAGEMENT/SOCIAL WORK SECTION    Inpatient Status Date: ***    Readmission Risk Assessment Score:  Readmission Risk              Risk of Unplanned Readmission:        13           Discharging to Facility/ Agency   · Name:   · Address:  · Phone:  · Fax:    Dialysis Facility (if applicable)   · Name:  · Address:  · Dialysis Schedule:  · Phone:  · Fax:    / signature: {Esignature:[id number]}    PHYSICIAN SECTION    Prognosis: Fair    Condition at Discharge: Stable    Rehab Potential (if transferring to Rehab): Fair    Recommended Labs or Other Treatments After Discharge: f/u U/A, labs, CBC, BMP in 2 wks,    Physician Certification: I certify the above information and transfer of Soledad Tyler  is necessary for the continuing treatment of the diagnosis listed and that she requires [facility] for greater 30 days.      Update Admission H&P: No change in H&P    PHYSICIAN SIGNATURE:  Electronically signed by Savanah Peña MD on 6/14/19 at 4:14 PM

## 2019-06-14 NOTE — PROGRESS NOTES
Department of Internal Medicine  General Internal Medicine  Attending Progress Note      SUBJECTIVE:  Pt is quiet, sleepy this am. No no problems per nursing staff,report during last night. Afebrile. Code status will be resolved after talking to her son today. OBJECTIVE      Medications    Current Facility-Administered Medications: polyethylene glycol (GLYCOLAX) packet 17 g, 17 g, Oral, Daily  cefTRIAXone (ROCEPHIN) 1 g IVPB in 50 mL D5W minibag, 1 g, Intravenous, Q24H  enoxaparin (LOVENOX) injection 40 mg, 40 mg, Subcutaneous, Daily  dextrose 5 % and 0.45 % sodium chloride infusion, , Intravenous, Continuous  acetaminophen (TYLENOL) tablet 650 mg, 650 mg, Oral, Q6H PRN  aspirin chewable tablet 81 mg, 81 mg, Oral, Daily  atenolol (TENORMIN) tablet 25 mg, 25 mg, Oral, Daily  atorvastatin (LIPITOR) tablet 40 mg, 40 mg, Oral, Nightly  bisacodyl (DULCOLAX) suppository 10 mg, 10 mg, Rectal, Daily PRN  citalopram (CELEXA) tablet 10 mg, 10 mg, Oral, Daily  [START ON 6/26/2019] vitamin D (ERGOCALCIFEROL) capsule 50,000 Units, 50,000 Units, Oral, Q14 Days  magnesium hydroxide (MILK OF MAGNESIA) 400 MG/5ML suspension 30 mL, 30 mL, Oral, Daily PRN  oxybutynin (DITROPAN-XL) extended release tablet 5 mg, 5 mg, Oral, Daily  traMADol (ULTRAM) tablet 50 mg, 50 mg, Oral, Nightly PRN  ALPRAZolam (XANAX) tablet 0.25 mg, 0.25 mg, Oral, TID PRN  diazepam (VALIUM) tablet 5 mg, 5 mg, Oral, Q12H PRN  Physical    VITALS:  /76   Pulse 59   Temp 97.4 °F (36.3 °C) (Oral)   Resp 20   Ht 5' 5\" (1.651 m)   Wt 161 lb 13.1 oz (73.4 kg)   SpO2 98%   BMI 26.93 kg/m²   CONSTITUTIONAL:  Sleepy, but arousable. No verbal communications. Lungs: CTA  Heart: regular rhythm,   Abd: soft + BS, no HSM, non tender,  Extr: no e/c/c.   Data    CBC:   Lab Results   Component Value Date    WBC 7.0 06/12/2019    RBC 3.87 06/12/2019    HGB 12.4 06/12/2019    HCT 36.1 06/12/2019    MCV 93.2 06/12/2019    MCH 31.9 06/12/2019    MCHC 34.2 06/12/2019 RDW 13.9 06/12/2019     06/12/2019    MPV 6.9 06/12/2019     BMP:    Lab Results   Component Value Date     06/12/2019    K 4.5 06/12/2019     06/12/2019    CO2 23 06/12/2019    BUN 23 06/12/2019    LABALBU 3.7 06/12/2019    CREATININE 1.0 06/12/2019    CALCIUM 9.7 06/12/2019    GFRAA >60 06/12/2019    LABGLOM 52 06/12/2019    GLUCOSE 104 06/12/2019       ASSESSMENT AND PLAN      Patient Active Hospital Problem List:   Mental confusion (6/12/2019)   same, no changes,    NSTEMI (non-ST elevated myocardial infarction) (HCC) ()   cont ASA,Atenolol, apreciated cardio consult, no cardiac procedure recommended,   Acute encephalopathy ()   most likely related to UTI, NSTEMI, stable,   History of CVA (cerebrovascular accident) ()   stable no new cerebrovascular events,   Dementia,cont med,  HTN controlled,  Hx of Hx, Fx, DVT on Lovenox for DVT profilaxis,  Will ask her son about her code status today.   D/c when OK with cardio,  Dr Yvonne Kwan

## 2019-06-15 NOTE — PROGRESS NOTES
Patient discharged per transport to [facility]. VSS. Patient belongings with patient.

## 2019-09-21 ENCOUNTER — APPOINTMENT (OUTPATIENT)
Dept: CT IMAGING | Age: 84
DRG: 689 | End: 2019-09-21
Payer: MEDICARE

## 2019-09-21 ENCOUNTER — APPOINTMENT (OUTPATIENT)
Dept: GENERAL RADIOLOGY | Age: 84
DRG: 689 | End: 2019-09-21
Payer: MEDICARE

## 2019-09-21 ENCOUNTER — HOSPITAL ENCOUNTER (INPATIENT)
Age: 84
LOS: 3 days | Discharge: NURSING, REHAB OR LONG-TERM CARE | DRG: 689 | End: 2019-09-24
Attending: SPECIALIST | Admitting: SPECIALIST
Payer: MEDICARE

## 2019-09-21 DIAGNOSIS — I21.4 NSTEMI (NON-ST ELEVATED MYOCARDIAL INFARCTION) (HCC): ICD-10-CM

## 2019-09-21 DIAGNOSIS — N39.0 URINARY TRACT INFECTION WITHOUT HEMATURIA, SITE UNSPECIFIED: Primary | ICD-10-CM

## 2019-09-21 LAB
ANION GAP SERPL CALCULATED.3IONS-SCNC: 15 MMOL/L (ref 3–16)
BACTERIA: ABNORMAL /HPF
BASOPHILS ABSOLUTE: 0.1 K/UL (ref 0–0.2)
BASOPHILS RELATIVE PERCENT: 1.2 %
BILIRUBIN URINE: NEGATIVE
BLOOD, URINE: NEGATIVE
BUN BLDV-MCNC: 23 MG/DL (ref 7–20)
CALCIUM SERPL-MCNC: 9.7 MG/DL (ref 8.3–10.6)
CHLORIDE BLD-SCNC: 105 MMOL/L (ref 99–110)
CLARITY: ABNORMAL
CO2: 22 MMOL/L (ref 21–32)
COLOR: YELLOW
CREAT SERPL-MCNC: 0.9 MG/DL (ref 0.6–1.2)
EOSINOPHILS ABSOLUTE: 0.3 K/UL (ref 0–0.6)
EOSINOPHILS RELATIVE PERCENT: 4.4 %
EPITHELIAL CELLS, UA: 1 /HPF (ref 0–5)
GFR AFRICAN AMERICAN: >60
GFR NON-AFRICAN AMERICAN: 59
GLUCOSE BLD-MCNC: 99 MG/DL (ref 70–99)
GLUCOSE URINE: NEGATIVE MG/DL
HCT VFR BLD CALC: 40 % (ref 36–48)
HEMOGLOBIN: 13.5 G/DL (ref 12–16)
HYALINE CASTS: 1 /LPF (ref 0–8)
KETONES, URINE: NEGATIVE MG/DL
LEUKOCYTE ESTERASE, URINE: ABNORMAL
LYMPHOCYTES ABSOLUTE: 1.3 K/UL (ref 1–5.1)
LYMPHOCYTES RELATIVE PERCENT: 19.3 %
MCH RBC QN AUTO: 31.6 PG (ref 26–34)
MCHC RBC AUTO-ENTMCNC: 33.7 G/DL (ref 31–36)
MCV RBC AUTO: 93.9 FL (ref 80–100)
MICROSCOPIC EXAMINATION: YES
MONOCYTES ABSOLUTE: 0.6 K/UL (ref 0–1.3)
MONOCYTES RELATIVE PERCENT: 8.6 %
NEUTROPHILS ABSOLUTE: 4.4 K/UL (ref 1.7–7.7)
NEUTROPHILS RELATIVE PERCENT: 66.5 %
NITRITE, URINE: POSITIVE
PDW BLD-RTO: 14.8 % (ref 12.4–15.4)
PH UA: 6 (ref 5–8)
PLATELET # BLD: 264 K/UL (ref 135–450)
PMV BLD AUTO: 7.5 FL (ref 5–10.5)
POTASSIUM REFLEX MAGNESIUM: 4.5 MMOL/L (ref 3.5–5.1)
PROTEIN UA: ABNORMAL MG/DL
RBC # BLD: 4.26 M/UL (ref 4–5.2)
RBC UA: 4 /HPF (ref 0–4)
SODIUM BLD-SCNC: 142 MMOL/L (ref 136–145)
SPECIFIC GRAVITY UA: 1.02 (ref 1–1.03)
TROPONIN: 0.02 NG/ML
TROPONIN: 0.05 NG/ML
TROPONIN: 0.09 NG/ML
URINE REFLEX TO CULTURE: YES
URINE TYPE: ABNORMAL
UROBILINOGEN, URINE: 0.2 E.U./DL
WBC # BLD: 6.7 K/UL (ref 4–11)
WBC UA: 23 /HPF (ref 0–5)

## 2019-09-21 PROCEDURE — 93005 ELECTROCARDIOGRAM TRACING: CPT | Performed by: PHYSICIAN ASSISTANT

## 2019-09-21 PROCEDURE — 87086 URINE CULTURE/COLONY COUNT: CPT

## 2019-09-21 PROCEDURE — 36415 COLL VENOUS BLD VENIPUNCTURE: CPT

## 2019-09-21 PROCEDURE — 81001 URINALYSIS AUTO W/SCOPE: CPT

## 2019-09-21 PROCEDURE — 84484 ASSAY OF TROPONIN QUANT: CPT

## 2019-09-21 PROCEDURE — 85025 COMPLETE CBC W/AUTO DIFF WBC: CPT

## 2019-09-21 PROCEDURE — 87186 SC STD MICRODIL/AGAR DIL: CPT

## 2019-09-21 PROCEDURE — 6370000000 HC RX 637 (ALT 250 FOR IP): Performed by: PHYSICIAN ASSISTANT

## 2019-09-21 PROCEDURE — 99285 EMERGENCY DEPT VISIT HI MDM: CPT

## 2019-09-21 PROCEDURE — 6360000002 HC RX W HCPCS: Performed by: SPECIALIST

## 2019-09-21 PROCEDURE — 80048 BASIC METABOLIC PNL TOTAL CA: CPT

## 2019-09-21 PROCEDURE — 87077 CULTURE AEROBIC IDENTIFY: CPT

## 2019-09-21 PROCEDURE — 71046 X-RAY EXAM CHEST 2 VIEWS: CPT

## 2019-09-21 PROCEDURE — 2580000003 HC RX 258: Performed by: SPECIALIST

## 2019-09-21 PROCEDURE — 70450 CT HEAD/BRAIN W/O DYE: CPT

## 2019-09-21 PROCEDURE — 1200000000 HC SEMI PRIVATE

## 2019-09-21 RX ORDER — ATENOLOL 25 MG/1
25 TABLET ORAL DAILY
Status: DISCONTINUED | OUTPATIENT
Start: 2019-09-22 | End: 2019-09-23

## 2019-09-21 RX ORDER — SODIUM CHLORIDE 450 MG/100ML
INJECTION, SOLUTION INTRAVENOUS CONTINUOUS
Status: DISCONTINUED | OUTPATIENT
Start: 2019-09-21 | End: 2019-09-24 | Stop reason: HOSPADM

## 2019-09-21 RX ORDER — ACETAMINOPHEN 325 MG/1
650 TABLET ORAL EVERY 6 HOURS PRN
Status: DISCONTINUED | OUTPATIENT
Start: 2019-09-21 | End: 2019-09-24 | Stop reason: HOSPADM

## 2019-09-21 RX ORDER — ACETAMINOPHEN 325 MG/1
650 TABLET ORAL EVERY 6 HOURS PRN
Status: DISCONTINUED | OUTPATIENT
Start: 2019-09-21 | End: 2019-09-21

## 2019-09-21 RX ORDER — ATORVASTATIN CALCIUM 40 MG/1
40 TABLET, FILM COATED ORAL NIGHTLY
Status: DISCONTINUED | OUTPATIENT
Start: 2019-09-22 | End: 2019-09-24 | Stop reason: HOSPADM

## 2019-09-21 RX ORDER — HEPARIN SODIUM 5000 [USP'U]/ML
5000 INJECTION, SOLUTION INTRAVENOUS; SUBCUTANEOUS EVERY 8 HOURS SCHEDULED
Status: DISCONTINUED | OUTPATIENT
Start: 2019-09-21 | End: 2019-09-24 | Stop reason: HOSPADM

## 2019-09-21 RX ORDER — TRAMADOL HYDROCHLORIDE 50 MG/1
50 TABLET ORAL NIGHTLY PRN
Status: DISCONTINUED | OUTPATIENT
Start: 2019-09-22 | End: 2019-09-24 | Stop reason: HOSPADM

## 2019-09-21 RX ORDER — BISACODYL 10 MG
10 SUPPOSITORY, RECTAL RECTAL DAILY PRN
Status: DISCONTINUED | OUTPATIENT
Start: 2019-09-21 | End: 2019-09-24 | Stop reason: HOSPADM

## 2019-09-21 RX ORDER — POLYETHYLENE GLYCOL 3350 17 G/17G
17 POWDER, FOR SOLUTION ORAL DAILY PRN
Status: DISCONTINUED | OUTPATIENT
Start: 2019-09-21 | End: 2019-09-24 | Stop reason: HOSPADM

## 2019-09-21 RX ORDER — ASPIRIN 81 MG/1
81 TABLET, CHEWABLE ORAL DAILY
Status: DISCONTINUED | OUTPATIENT
Start: 2019-09-22 | End: 2019-09-24 | Stop reason: HOSPADM

## 2019-09-21 RX ORDER — ASPIRIN 81 MG/1
162 TABLET, CHEWABLE ORAL ONCE
Status: COMPLETED | OUTPATIENT
Start: 2019-09-21 | End: 2019-09-21

## 2019-09-21 RX ORDER — CEFUROXIME AXETIL 250 MG/1
250 TABLET ORAL ONCE
Status: COMPLETED | OUTPATIENT
Start: 2019-09-21 | End: 2019-09-21

## 2019-09-21 RX ORDER — CITALOPRAM 10 MG/1
10 TABLET ORAL DAILY
Status: DISCONTINUED | OUTPATIENT
Start: 2019-09-22 | End: 2019-09-24 | Stop reason: HOSPADM

## 2019-09-21 RX ADMIN — ASPIRIN 81 MG 162 MG: 81 TABLET ORAL at 18:47

## 2019-09-21 RX ADMIN — SODIUM CHLORIDE: 4.5 INJECTION, SOLUTION INTRAVENOUS at 23:16

## 2019-09-21 RX ADMIN — CEFUROXIME AXETIL 250 MG: 250 TABLET ORAL at 16:01

## 2019-09-21 RX ADMIN — HEPARIN SODIUM 5000 UNITS: 5000 INJECTION INTRAVENOUS; SUBCUTANEOUS at 23:17

## 2019-09-21 ASSESSMENT — PAIN SCALES - PAIN ASSESSMENT IN ADVANCED DEMENTIA (PAINAD)
FACIALEXPRESSION: 0
NEGVOCALIZATION: 0
BODYLANGUAGE: 0
CONSOLABILITY: 0
BREATHING: 0
TOTALSCORE: 0

## 2019-09-22 LAB
EKG ATRIAL RATE: 70 BPM
EKG ATRIAL RATE: 72 BPM
EKG DIAGNOSIS: NORMAL
EKG DIAGNOSIS: NORMAL
EKG P AXIS: 49 DEGREES
EKG P AXIS: 92 DEGREES
EKG P-R INTERVAL: 200 MS
EKG P-R INTERVAL: 202 MS
EKG Q-T INTERVAL: 396 MS
EKG Q-T INTERVAL: 408 MS
EKG QRS DURATION: 64 MS
EKG QRS DURATION: 72 MS
EKG QTC CALCULATION (BAZETT): 433 MS
EKG QTC CALCULATION (BAZETT): 440 MS
EKG R AXIS: 25 DEGREES
EKG R AXIS: 37 DEGREES
EKG T AXIS: 25 DEGREES
EKG T AXIS: 54 DEGREES
EKG VENTRICULAR RATE: 70 BPM
EKG VENTRICULAR RATE: 72 BPM
TROPONIN: <0.01 NG/ML

## 2019-09-22 PROCEDURE — 2580000003 HC RX 258: Performed by: SPECIALIST

## 2019-09-22 PROCEDURE — 1200000000 HC SEMI PRIVATE

## 2019-09-22 PROCEDURE — 6370000000 HC RX 637 (ALT 250 FOR IP): Performed by: SPECIALIST

## 2019-09-22 PROCEDURE — 84484 ASSAY OF TROPONIN QUANT: CPT

## 2019-09-22 PROCEDURE — 6360000002 HC RX W HCPCS: Performed by: SPECIALIST

## 2019-09-22 PROCEDURE — 99223 1ST HOSP IP/OBS HIGH 75: CPT | Performed by: INTERNAL MEDICINE

## 2019-09-22 PROCEDURE — 94760 N-INVAS EAR/PLS OXIMETRY 1: CPT

## 2019-09-22 PROCEDURE — 36415 COLL VENOUS BLD VENIPUNCTURE: CPT

## 2019-09-22 PROCEDURE — 93010 ELECTROCARDIOGRAM REPORT: CPT | Performed by: INTERNAL MEDICINE

## 2019-09-22 RX ORDER — CEFUROXIME AXETIL 250 MG/1
500 TABLET ORAL EVERY 12 HOURS SCHEDULED
Status: DISCONTINUED | OUTPATIENT
Start: 2019-09-22 | End: 2019-09-24 | Stop reason: HOSPADM

## 2019-09-22 RX ADMIN — HEPARIN SODIUM 5000 UNITS: 5000 INJECTION INTRAVENOUS; SUBCUTANEOUS at 21:00

## 2019-09-22 RX ADMIN — ATENOLOL 25 MG: 25 TABLET ORAL at 09:13

## 2019-09-22 RX ADMIN — CEFUROXIME AXETIL 500 MG: 250 TABLET ORAL at 09:13

## 2019-09-22 RX ADMIN — CEFUROXIME AXETIL 500 MG: 250 TABLET ORAL at 21:00

## 2019-09-22 RX ADMIN — HEPARIN SODIUM 5000 UNITS: 5000 INJECTION INTRAVENOUS; SUBCUTANEOUS at 14:03

## 2019-09-22 RX ADMIN — ATORVASTATIN CALCIUM 40 MG: 40 TABLET, FILM COATED ORAL at 21:00

## 2019-09-22 RX ADMIN — HEPARIN SODIUM 5000 UNITS: 5000 INJECTION INTRAVENOUS; SUBCUTANEOUS at 06:26

## 2019-09-22 RX ADMIN — CITALOPRAM HYDROBROMIDE 10 MG: 10 TABLET ORAL at 09:13

## 2019-09-22 RX ADMIN — ASPIRIN 81 MG 81 MG: 81 TABLET ORAL at 09:13

## 2019-09-22 RX ADMIN — SODIUM CHLORIDE: 4.5 INJECTION, SOLUTION INTRAVENOUS at 11:50

## 2019-09-22 ASSESSMENT — PAIN SCALES - GENERAL: PAINLEVEL_OUTOF10: 0

## 2019-09-23 LAB
LV EF: 60 %
LVEF MODALITY: NORMAL
ORGANISM: ABNORMAL
TROPONIN: <0.01 NG/ML
URINE CULTURE, ROUTINE: ABNORMAL

## 2019-09-23 PROCEDURE — 6360000002 HC RX W HCPCS: Performed by: SPECIALIST

## 2019-09-23 PROCEDURE — 36415 COLL VENOUS BLD VENIPUNCTURE: CPT

## 2019-09-23 PROCEDURE — 6370000000 HC RX 637 (ALT 250 FOR IP): Performed by: SPECIALIST

## 2019-09-23 PROCEDURE — 99232 SBSQ HOSP IP/OBS MODERATE 35: CPT | Performed by: NURSE PRACTITIONER

## 2019-09-23 PROCEDURE — 84484 ASSAY OF TROPONIN QUANT: CPT

## 2019-09-23 PROCEDURE — 1200000000 HC SEMI PRIVATE

## 2019-09-23 PROCEDURE — 93306 TTE W/DOPPLER COMPLETE: CPT

## 2019-09-23 PROCEDURE — 2580000003 HC RX 258: Performed by: SPECIALIST

## 2019-09-23 RX ORDER — ATENOLOL 50 MG/1
50 TABLET ORAL DAILY
Status: DISCONTINUED | OUTPATIENT
Start: 2019-09-24 | End: 2019-09-24 | Stop reason: HOSPADM

## 2019-09-23 RX ADMIN — SODIUM CHLORIDE: 4.5 INJECTION, SOLUTION INTRAVENOUS at 13:02

## 2019-09-23 RX ADMIN — CITALOPRAM HYDROBROMIDE 10 MG: 10 TABLET ORAL at 08:07

## 2019-09-23 RX ADMIN — ATENOLOL 25 MG: 25 TABLET ORAL at 08:07

## 2019-09-23 RX ADMIN — HEPARIN SODIUM 5000 UNITS: 5000 INJECTION INTRAVENOUS; SUBCUTANEOUS at 20:51

## 2019-09-23 RX ADMIN — HEPARIN SODIUM 5000 UNITS: 5000 INJECTION INTRAVENOUS; SUBCUTANEOUS at 06:39

## 2019-09-23 RX ADMIN — HEPARIN SODIUM 5000 UNITS: 5000 INJECTION INTRAVENOUS; SUBCUTANEOUS at 13:02

## 2019-09-23 RX ADMIN — CEFUROXIME AXETIL 500 MG: 250 TABLET ORAL at 08:07

## 2019-09-23 RX ADMIN — SODIUM CHLORIDE: 4.5 INJECTION, SOLUTION INTRAVENOUS at 01:10

## 2019-09-23 RX ADMIN — ASPIRIN 81 MG 81 MG: 81 TABLET ORAL at 08:07

## 2019-09-23 RX ADMIN — CEFUROXIME AXETIL 500 MG: 250 TABLET ORAL at 20:48

## 2019-09-23 RX ADMIN — ATORVASTATIN CALCIUM 40 MG: 40 TABLET, FILM COATED ORAL at 20:48

## 2019-09-23 ASSESSMENT — PAIN SCALES - WONG BAKER
WONGBAKER_NUMERICALRESPONSE: 0

## 2019-09-23 ASSESSMENT — PAIN SCALES - PAIN ASSESSMENT IN ADVANCED DEMENTIA (PAINAD)
CONSOLABILITY: 0
BODYLANGUAGE: 0
FACIALEXPRESSION: 0
BREATHING: 0
NEGVOCALIZATION: 0
TOTALSCORE: 0

## 2019-09-23 ASSESSMENT — PAIN SCALES - GENERAL
PAINLEVEL_OUTOF10: 0
PAINLEVEL_OUTOF10: 1
PAINLEVEL_OUTOF10: 0

## 2019-09-24 VITALS
RESPIRATION RATE: 20 BRPM | BODY MASS INDEX: 21.21 KG/M2 | HEART RATE: 61 BPM | HEIGHT: 67 IN | DIASTOLIC BLOOD PRESSURE: 75 MMHG | SYSTOLIC BLOOD PRESSURE: 103 MMHG | TEMPERATURE: 98.3 F | OXYGEN SATURATION: 91 % | WEIGHT: 135.14 LBS

## 2019-09-24 LAB
TROPONIN: <0.01 NG/ML
TROPONIN: <0.01 NG/ML

## 2019-09-24 PROCEDURE — 99232 SBSQ HOSP IP/OBS MODERATE 35: CPT | Performed by: NURSE PRACTITIONER

## 2019-09-24 PROCEDURE — 6370000000 HC RX 637 (ALT 250 FOR IP): Performed by: NURSE PRACTITIONER

## 2019-09-24 PROCEDURE — 6360000002 HC RX W HCPCS: Performed by: SPECIALIST

## 2019-09-24 PROCEDURE — 84484 ASSAY OF TROPONIN QUANT: CPT

## 2019-09-24 PROCEDURE — 36415 COLL VENOUS BLD VENIPUNCTURE: CPT

## 2019-09-24 PROCEDURE — 6370000000 HC RX 637 (ALT 250 FOR IP): Performed by: SPECIALIST

## 2019-09-24 PROCEDURE — 2580000003 HC RX 258: Performed by: SPECIALIST

## 2019-09-24 RX ORDER — CEFUROXIME AXETIL 500 MG/1
500 TABLET ORAL EVERY 12 HOURS SCHEDULED
Qty: 6 TABLET | Refills: 0 | Status: SHIPPED | OUTPATIENT
Start: 2019-09-24 | End: 2019-09-27

## 2019-09-24 RX ADMIN — ATENOLOL 50 MG: 50 TABLET ORAL at 08:16

## 2019-09-24 RX ADMIN — HEPARIN SODIUM 5000 UNITS: 5000 INJECTION INTRAVENOUS; SUBCUTANEOUS at 06:38

## 2019-09-24 RX ADMIN — SODIUM CHLORIDE: 4.5 INJECTION, SOLUTION INTRAVENOUS at 01:07

## 2019-09-24 RX ADMIN — ASPIRIN 81 MG 81 MG: 81 TABLET ORAL at 08:17

## 2019-09-24 RX ADMIN — CITALOPRAM HYDROBROMIDE 10 MG: 10 TABLET ORAL at 08:17

## 2019-09-24 RX ADMIN — CEFUROXIME AXETIL 500 MG: 250 TABLET ORAL at 08:16

## 2019-09-24 ASSESSMENT — PAIN SCALES - PAIN ASSESSMENT IN ADVANCED DEMENTIA (PAINAD)
BODYLANGUAGE: 0
BREATHING: 0
NEGVOCALIZATION: 0
CONSOLABILITY: 0
FACIALEXPRESSION: 0
TOTALSCORE: 0

## 2019-09-24 ASSESSMENT — PAIN SCALES - WONG BAKER
WONGBAKER_NUMERICALRESPONSE: 0

## 2019-09-24 ASSESSMENT — PAIN SCALES - GENERAL: PAINLEVEL_OUTOF10: 0

## 2019-12-03 ENCOUNTER — APPOINTMENT (OUTPATIENT)
Dept: GENERAL RADIOLOGY | Age: 84
DRG: 871 | End: 2019-12-03
Payer: MEDICARE

## 2019-12-03 ENCOUNTER — APPOINTMENT (OUTPATIENT)
Dept: CT IMAGING | Age: 84
DRG: 871 | End: 2019-12-03
Payer: MEDICARE

## 2019-12-03 ENCOUNTER — HOSPITAL ENCOUNTER (INPATIENT)
Age: 84
LOS: 3 days | Discharge: HOME | DRG: 871 | End: 2019-12-06
Attending: SPECIALIST | Admitting: SPECIALIST
Payer: MEDICARE

## 2019-12-03 DIAGNOSIS — N39.0 ACUTE URINARY TRACT INFECTION: ICD-10-CM

## 2019-12-03 DIAGNOSIS — E87.0 ACUTE HYPERNATREMIA: ICD-10-CM

## 2019-12-03 DIAGNOSIS — A41.9 SEPTICEMIA (HCC): Primary | ICD-10-CM

## 2019-12-03 LAB
A/G RATIO: 0.8 (ref 1.1–2.2)
ALBUMIN SERPL-MCNC: 3.9 G/DL (ref 3.4–5)
ALP BLD-CCNC: 81 U/L (ref 40–129)
ALT SERPL-CCNC: 18 U/L (ref 10–40)
ANION GAP SERPL CALCULATED.3IONS-SCNC: 16 MMOL/L (ref 3–16)
AST SERPL-CCNC: 17 U/L (ref 15–37)
BACTERIA: ABNORMAL /HPF
BASOPHILS ABSOLUTE: 0.1 K/UL (ref 0–0.2)
BASOPHILS RELATIVE PERCENT: 0.8 %
BILIRUB SERPL-MCNC: 1 MG/DL (ref 0–1)
BILIRUBIN URINE: ABNORMAL
BLOOD, URINE: ABNORMAL
BUN BLDV-MCNC: 39 MG/DL (ref 7–20)
CALCIUM SERPL-MCNC: 10.7 MG/DL (ref 8.3–10.6)
CASTS: ABNORMAL /LPF
CHLORIDE BLD-SCNC: 119 MMOL/L (ref 99–110)
CLARITY: ABNORMAL
CO2: 20 MMOL/L (ref 21–32)
COLOR: ABNORMAL
COMMENT UA: ABNORMAL
CREAT SERPL-MCNC: 1.1 MG/DL (ref 0.6–1.2)
EOSINOPHILS ABSOLUTE: 0.1 K/UL (ref 0–0.6)
EOSINOPHILS RELATIVE PERCENT: 0.5 %
EPITHELIAL CELLS, UA: 11 /HPF (ref 0–5)
GFR AFRICAN AMERICAN: 56
GFR NON-AFRICAN AMERICAN: 47
GLOBULIN: 4.9 G/DL
GLUCOSE BLD-MCNC: 125 MG/DL (ref 70–99)
GLUCOSE URINE: NEGATIVE MG/DL
HCT VFR BLD CALC: 45.1 % (ref 36–48)
HEMOGLOBIN: 14.7 G/DL (ref 12–16)
KETONES, URINE: NEGATIVE MG/DL
LACTIC ACID, SEPSIS: 2 MMOL/L (ref 0.4–1.9)
LACTIC ACID, SEPSIS: 3.3 MMOL/L (ref 0.4–1.9)
LEUKOCYTE ESTERASE, URINE: ABNORMAL
LIPASE: 7 U/L (ref 13–60)
LYMPHOCYTES ABSOLUTE: 2.2 K/UL (ref 1–5.1)
LYMPHOCYTES RELATIVE PERCENT: 17.4 %
MCH RBC QN AUTO: 31.3 PG (ref 26–34)
MCHC RBC AUTO-ENTMCNC: 32.6 G/DL (ref 31–36)
MCV RBC AUTO: 95.9 FL (ref 80–100)
MICROSCOPIC EXAMINATION: YES
MONOCYTES ABSOLUTE: 1.1 K/UL (ref 0–1.3)
MONOCYTES RELATIVE PERCENT: 8.6 %
MUCUS: ABNORMAL /LPF
NEUTROPHILS ABSOLUTE: 9 K/UL (ref 1.7–7.7)
NEUTROPHILS RELATIVE PERCENT: 72.7 %
NITRITE, URINE: POSITIVE
PDW BLD-RTO: 15 % (ref 12.4–15.4)
PH UA: 5.5 (ref 5–8)
PLATELET # BLD: 253 K/UL (ref 135–450)
PMV BLD AUTO: 9.2 FL (ref 5–10.5)
POTASSIUM REFLEX MAGNESIUM: 3.8 MMOL/L (ref 3.5–5.1)
PRO-BNP: 609 PG/ML (ref 0–449)
PROTEIN UA: 100 MG/DL
RBC # BLD: 4.71 M/UL (ref 4–5.2)
RBC UA: 1 /HPF (ref 0–4)
SODIUM BLD-SCNC: 155 MMOL/L (ref 136–145)
SPECIFIC GRAVITY UA: 1.03 (ref 1–1.03)
TOTAL PROTEIN: 8.8 G/DL (ref 6.4–8.2)
TROPONIN: <0.01 NG/ML
TSH REFLEX: 3.12 UIU/ML (ref 0.27–4.2)
URINE REFLEX TO CULTURE: YES
URINE TYPE: ABNORMAL
UROBILINOGEN, URINE: 1 E.U./DL
WBC # BLD: 12.3 K/UL (ref 4–11)
WBC UA: 473 /HPF (ref 0–5)

## 2019-12-03 PROCEDURE — 71045 X-RAY EXAM CHEST 1 VIEW: CPT

## 2019-12-03 PROCEDURE — 70450 CT HEAD/BRAIN W/O DYE: CPT

## 2019-12-03 PROCEDURE — 84443 ASSAY THYROID STIM HORMONE: CPT

## 2019-12-03 PROCEDURE — 83690 ASSAY OF LIPASE: CPT

## 2019-12-03 PROCEDURE — 2580000003 HC RX 258: Performed by: PHYSICIAN ASSISTANT

## 2019-12-03 PROCEDURE — 96367 TX/PROPH/DG ADDL SEQ IV INF: CPT

## 2019-12-03 PROCEDURE — 87040 BLOOD CULTURE FOR BACTERIA: CPT

## 2019-12-03 PROCEDURE — 96365 THER/PROPH/DIAG IV INF INIT: CPT

## 2019-12-03 PROCEDURE — 6360000002 HC RX W HCPCS: Performed by: PHYSICIAN ASSISTANT

## 2019-12-03 PROCEDURE — 96366 THER/PROPH/DIAG IV INF ADDON: CPT

## 2019-12-03 PROCEDURE — 83605 ASSAY OF LACTIC ACID: CPT

## 2019-12-03 PROCEDURE — 36415 COLL VENOUS BLD VENIPUNCTURE: CPT

## 2019-12-03 PROCEDURE — 51701 INSERT BLADDER CATHETER: CPT

## 2019-12-03 PROCEDURE — 87077 CULTURE AEROBIC IDENTIFY: CPT

## 2019-12-03 PROCEDURE — 83880 ASSAY OF NATRIURETIC PEPTIDE: CPT

## 2019-12-03 PROCEDURE — 87086 URINE CULTURE/COLONY COUNT: CPT

## 2019-12-03 PROCEDURE — 80053 COMPREHEN METABOLIC PANEL: CPT

## 2019-12-03 PROCEDURE — 81001 URINALYSIS AUTO W/SCOPE: CPT

## 2019-12-03 PROCEDURE — 2580000003 HC RX 258: Performed by: SPECIALIST

## 2019-12-03 PROCEDURE — 84484 ASSAY OF TROPONIN QUANT: CPT

## 2019-12-03 PROCEDURE — 85025 COMPLETE CBC W/AUTO DIFF WBC: CPT

## 2019-12-03 PROCEDURE — 1200000000 HC SEMI PRIVATE

## 2019-12-03 PROCEDURE — 87186 SC STD MICRODIL/AGAR DIL: CPT

## 2019-12-03 PROCEDURE — 93005 ELECTROCARDIOGRAM TRACING: CPT | Performed by: PHYSICIAN ASSISTANT

## 2019-12-03 PROCEDURE — 99285 EMERGENCY DEPT VISIT HI MDM: CPT

## 2019-12-03 RX ORDER — DEXTROSE AND SODIUM CHLORIDE 5; .45 G/100ML; G/100ML
INJECTION, SOLUTION INTRAVENOUS CONTINUOUS
Status: DISCONTINUED | OUTPATIENT
Start: 2019-12-03 | End: 2019-12-06

## 2019-12-03 RX ORDER — SODIUM CHLORIDE, SODIUM LACTATE, POTASSIUM CHLORIDE, CALCIUM CHLORIDE 600; 310; 30; 20 MG/100ML; MG/100ML; MG/100ML; MG/100ML
500 INJECTION, SOLUTION INTRAVENOUS ONCE
Status: DISCONTINUED | OUTPATIENT
Start: 2019-12-03 | End: 2019-12-03

## 2019-12-03 RX ORDER — SODIUM CHLORIDE 9 MG/ML
30 INJECTION, SOLUTION INTRAVENOUS ONCE
Status: COMPLETED | OUTPATIENT
Start: 2019-12-03 | End: 2019-12-03

## 2019-12-03 RX ADMIN — PIPERACILLIN AND TAZOBACTAM 4.5 G: 4; .5 INJECTION, POWDER, LYOPHILIZED, FOR SOLUTION INTRAVENOUS at 17:06

## 2019-12-03 RX ADMIN — SODIUM CHLORIDE 2127 ML: 9 INJECTION, SOLUTION INTRAVENOUS at 17:07

## 2019-12-03 RX ADMIN — DEXTROSE AND SODIUM CHLORIDE: 5; 450 INJECTION, SOLUTION INTRAVENOUS at 21:17

## 2019-12-04 PROBLEM — E44.1 MILD MALNUTRITION (HCC): Chronic | Status: ACTIVE | Noted: 2019-12-04

## 2019-12-04 LAB
EKG ATRIAL RATE: 99 BPM
EKG DIAGNOSIS: NORMAL
EKG P AXIS: 91 DEGREES
EKG P-R INTERVAL: 150 MS
EKG Q-T INTERVAL: 346 MS
EKG QRS DURATION: 68 MS
EKG QTC CALCULATION (BAZETT): 444 MS
EKG R AXIS: 21 DEGREES
EKG T AXIS: -77 DEGREES
EKG VENTRICULAR RATE: 99 BPM

## 2019-12-04 PROCEDURE — 93010 ELECTROCARDIOGRAM REPORT: CPT | Performed by: INTERNAL MEDICINE

## 2019-12-04 PROCEDURE — 2580000003 HC RX 258: Performed by: SPECIALIST

## 2019-12-04 PROCEDURE — 92610 EVALUATE SWALLOWING FUNCTION: CPT

## 2019-12-04 PROCEDURE — 6360000002 HC RX W HCPCS: Performed by: SPECIALIST

## 2019-12-04 PROCEDURE — 1200000000 HC SEMI PRIVATE

## 2019-12-04 RX ADMIN — ENOXAPARIN SODIUM 30 MG: 30 INJECTION SUBCUTANEOUS at 10:29

## 2019-12-04 RX ADMIN — PIPERACILLIN AND TAZOBACTAM 3.38 G: 3; .375 INJECTION, POWDER, LYOPHILIZED, FOR SOLUTION INTRAVENOUS at 17:23

## 2019-12-04 RX ADMIN — PIPERACILLIN AND TAZOBACTAM 3.38 G: 3; .375 INJECTION, POWDER, LYOPHILIZED, FOR SOLUTION INTRAVENOUS at 10:28

## 2019-12-04 RX ADMIN — DEXTROSE AND SODIUM CHLORIDE: 5; 450 INJECTION, SOLUTION INTRAVENOUS at 10:28

## 2019-12-04 RX ADMIN — PIPERACILLIN AND TAZOBACTAM 3.38 G: 3; .375 INJECTION, POWDER, LYOPHILIZED, FOR SOLUTION INTRAVENOUS at 03:09

## 2019-12-04 ASSESSMENT — PAIN SCALES - WONG BAKER
WONGBAKER_NUMERICALRESPONSE: 0

## 2019-12-05 LAB
ORGANISM: ABNORMAL
URINE CULTURE, ROUTINE: ABNORMAL

## 2019-12-05 PROCEDURE — 1200000000 HC SEMI PRIVATE

## 2019-12-05 PROCEDURE — 94760 N-INVAS EAR/PLS OXIMETRY 1: CPT

## 2019-12-05 PROCEDURE — 99233 SBSQ HOSP IP/OBS HIGH 50: CPT | Performed by: INTERNAL MEDICINE

## 2019-12-05 PROCEDURE — 92526 ORAL FUNCTION THERAPY: CPT

## 2019-12-05 PROCEDURE — 6360000002 HC RX W HCPCS: Performed by: SPECIALIST

## 2019-12-05 PROCEDURE — 2580000003 HC RX 258: Performed by: SPECIALIST

## 2019-12-05 RX ADMIN — PIPERACILLIN AND TAZOBACTAM 3.38 G: 3; .375 INJECTION, POWDER, LYOPHILIZED, FOR SOLUTION INTRAVENOUS at 17:59

## 2019-12-05 RX ADMIN — DEXTROSE AND SODIUM CHLORIDE: 5; 450 INJECTION, SOLUTION INTRAVENOUS at 21:21

## 2019-12-05 RX ADMIN — PIPERACILLIN AND TAZOBACTAM 3.38 G: 3; .375 INJECTION, POWDER, LYOPHILIZED, FOR SOLUTION INTRAVENOUS at 00:41

## 2019-12-05 RX ADMIN — ENOXAPARIN SODIUM 30 MG: 30 INJECTION SUBCUTANEOUS at 11:02

## 2019-12-05 RX ADMIN — PIPERACILLIN AND TAZOBACTAM 3.38 G: 3; .375 INJECTION, POWDER, LYOPHILIZED, FOR SOLUTION INTRAVENOUS at 11:02

## 2019-12-05 RX ADMIN — DEXTROSE AND SODIUM CHLORIDE: 5; 450 INJECTION, SOLUTION INTRAVENOUS at 11:14

## 2019-12-05 RX ADMIN — DEXTROSE AND SODIUM CHLORIDE: 5; 450 INJECTION, SOLUTION INTRAVENOUS at 00:41

## 2019-12-05 ASSESSMENT — PAIN SCALES - PAIN ASSESSMENT IN ADVANCED DEMENTIA (PAINAD)
CONSOLABILITY: 0
BODYLANGUAGE: 0
CONSOLABILITY: 0
TOTALSCORE: 0
BREATHING: 0
FACIALEXPRESSION: 0
BREATHING: 0
CONSOLABILITY: 0
NEGVOCALIZATION: 0
BREATHING: 0
TOTALSCORE: 0
BREATHING: 0
CONSOLABILITY: 0
NEGVOCALIZATION: 0
NEGVOCALIZATION: 0
TOTALSCORE: 0
BREATHING: 0
BODYLANGUAGE: 0
NEGVOCALIZATION: 0
BODYLANGUAGE: 0
TOTALSCORE: 0
CONSOLABILITY: 0
CONSOLABILITY: 0
FACIALEXPRESSION: 0
TOTALSCORE: 0
BODYLANGUAGE: 0
FACIALEXPRESSION: 0
BODYLANGUAGE: 0
NEGVOCALIZATION: 0
FACIALEXPRESSION: 0
BREATHING: 0
BODYLANGUAGE: 0
CONSOLABILITY: 0
NEGVOCALIZATION: 0
BREATHING: 0
FACIALEXPRESSION: 0
TOTALSCORE: 0
BODYLANGUAGE: 0
NEGVOCALIZATION: 0
TOTALSCORE: 0

## 2019-12-05 ASSESSMENT — PAIN SCALES - WONG BAKER
WONGBAKER_NUMERICALRESPONSE: 0

## 2019-12-06 VITALS
RESPIRATION RATE: 18 BRPM | TEMPERATURE: 97.5 F | HEIGHT: 67 IN | WEIGHT: 153.44 LBS | BODY MASS INDEX: 24.08 KG/M2 | DIASTOLIC BLOOD PRESSURE: 86 MMHG | OXYGEN SATURATION: 95 % | HEART RATE: 76 BPM | SYSTOLIC BLOOD PRESSURE: 136 MMHG

## 2019-12-06 LAB
A/G RATIO: 0.8 (ref 1.1–2.2)
ALBUMIN SERPL-MCNC: 3 G/DL (ref 3.4–5)
ALP BLD-CCNC: 54 U/L (ref 40–129)
ALT SERPL-CCNC: 23 U/L (ref 10–40)
ANION GAP SERPL CALCULATED.3IONS-SCNC: 15 MMOL/L (ref 3–16)
AST SERPL-CCNC: 24 U/L (ref 15–37)
BASOPHILS ABSOLUTE: 0 K/UL (ref 0–0.2)
BASOPHILS RELATIVE PERCENT: 0.9 %
BILIRUB SERPL-MCNC: 0.9 MG/DL (ref 0–1)
BUN BLDV-MCNC: 11 MG/DL (ref 7–20)
CALCIUM SERPL-MCNC: 8.5 MG/DL (ref 8.3–10.6)
CHLORIDE BLD-SCNC: 110 MMOL/L (ref 99–110)
CO2: 18 MMOL/L (ref 21–32)
CREAT SERPL-MCNC: 0.8 MG/DL (ref 0.6–1.2)
EOSINOPHILS ABSOLUTE: 0.3 K/UL (ref 0–0.6)
EOSINOPHILS RELATIVE PERCENT: 5.4 %
GFR AFRICAN AMERICAN: >60
GFR NON-AFRICAN AMERICAN: >60
GLOBULIN: 3.9 G/DL
GLUCOSE BLD-MCNC: 209 MG/DL (ref 70–99)
HCT VFR BLD CALC: 36.4 % (ref 36–48)
HEMOGLOBIN: 12.5 G/DL (ref 12–16)
LYMPHOCYTES ABSOLUTE: 1.7 K/UL (ref 1–5.1)
LYMPHOCYTES RELATIVE PERCENT: 33.6 %
MCH RBC QN AUTO: 31.9 PG (ref 26–34)
MCHC RBC AUTO-ENTMCNC: 34.2 G/DL (ref 31–36)
MCV RBC AUTO: 93.1 FL (ref 80–100)
MONOCYTES ABSOLUTE: 0.3 K/UL (ref 0–1.3)
MONOCYTES RELATIVE PERCENT: 6.4 %
NEUTROPHILS ABSOLUTE: 2.8 K/UL (ref 1.7–7.7)
NEUTROPHILS RELATIVE PERCENT: 53.7 %
PDW BLD-RTO: 14.1 % (ref 12.4–15.4)
PLATELET # BLD: 199 K/UL (ref 135–450)
PMV BLD AUTO: 8.4 FL (ref 5–10.5)
POTASSIUM SERPL-SCNC: 2.9 MMOL/L (ref 3.5–5.1)
RBC # BLD: 3.91 M/UL (ref 4–5.2)
SODIUM BLD-SCNC: 143 MMOL/L (ref 136–145)
TOTAL PROTEIN: 6.9 G/DL (ref 6.4–8.2)
WBC # BLD: 5.2 K/UL (ref 4–11)

## 2019-12-06 PROCEDURE — 6360000002 HC RX W HCPCS: Performed by: SPECIALIST

## 2019-12-06 PROCEDURE — 94760 N-INVAS EAR/PLS OXIMETRY 1: CPT

## 2019-12-06 PROCEDURE — 2580000003 HC RX 258: Performed by: SPECIALIST

## 2019-12-06 PROCEDURE — 36415 COLL VENOUS BLD VENIPUNCTURE: CPT

## 2019-12-06 PROCEDURE — 97127 HC SP THER IVNTJ W/FOCUS COG FUNCJ: CPT

## 2019-12-06 PROCEDURE — 80053 COMPREHEN METABOLIC PANEL: CPT

## 2019-12-06 PROCEDURE — 6370000000 HC RX 637 (ALT 250 FOR IP): Performed by: INTERNAL MEDICINE

## 2019-12-06 PROCEDURE — 92526 ORAL FUNCTION THERAPY: CPT

## 2019-12-06 PROCEDURE — 85025 COMPLETE CBC W/AUTO DIFF WBC: CPT

## 2019-12-06 PROCEDURE — 99238 HOSP IP/OBS DSCHRG MGMT 30/<: CPT | Performed by: INTERNAL MEDICINE

## 2019-12-06 RX ORDER — POTASSIUM CHLORIDE 20 MEQ/1
40 TABLET, EXTENDED RELEASE ORAL ONCE
Status: COMPLETED | OUTPATIENT
Start: 2019-12-06 | End: 2019-12-06

## 2019-12-06 RX ORDER — AMLODIPINE BESYLATE 5 MG/1
5 TABLET ORAL DAILY
Qty: 30 TABLET | Refills: 3 | Status: SHIPPED | OUTPATIENT
Start: 2019-12-06

## 2019-12-06 RX ORDER — POTASSIUM CHLORIDE 20 MEQ/1
20 TABLET, EXTENDED RELEASE ORAL 2 TIMES DAILY
Qty: 10 TABLET | Refills: 5 | Status: SHIPPED | OUTPATIENT
Start: 2019-12-06

## 2019-12-06 RX ORDER — AMOXICILLIN AND CLAVULANATE POTASSIUM 875; 125 MG/1; MG/1
1 TABLET, FILM COATED ORAL 2 TIMES DAILY
Qty: 14 TABLET | Refills: 0 | Status: SHIPPED | OUTPATIENT
Start: 2019-12-06 | End: 2019-12-13

## 2019-12-06 RX ADMIN — POTASSIUM CHLORIDE 40 MEQ: 1500 TABLET, EXTENDED RELEASE ORAL at 15:14

## 2019-12-06 RX ADMIN — ENOXAPARIN SODIUM 30 MG: 30 INJECTION SUBCUTANEOUS at 08:48

## 2019-12-06 RX ADMIN — PIPERACILLIN AND TAZOBACTAM 3.38 G: 3; .375 INJECTION, POWDER, LYOPHILIZED, FOR SOLUTION INTRAVENOUS at 00:55

## 2019-12-06 RX ADMIN — PIPERACILLIN AND TAZOBACTAM 3.38 G: 3; .375 INJECTION, POWDER, LYOPHILIZED, FOR SOLUTION INTRAVENOUS at 08:48

## 2019-12-06 ASSESSMENT — PAIN SCALES - GENERAL: PAINLEVEL_OUTOF10: 0

## 2019-12-07 LAB
BLOOD CULTURE, ROUTINE: NORMAL
CULTURE, BLOOD 2: NORMAL

## 2022-05-31 NOTE — PLAN OF CARE
Problem: Falls - Risk of:  Goal: Will remain free from falls  Description  Will remain free from falls  Note:   D: Pt.  Alert with expressive aphasia, bed alarm on and call light in reach  R: without falls this shift  A: will continue to monitor
Problem: Risk for Impaired Skin Integrity  Goal: Tissue integrity - skin and mucous membranes  Description  Structural intactness and normal physiological function of skin and  mucous membranes.   Outcome: Ongoing     Problem: Falls - Risk of:  Goal: Will remain free from falls  Description  Will remain free from falls  Outcome: Ongoing  Goal: Absence of physical injury  Description  Absence of physical injury  Outcome: Ongoing
Problem: Risk for Impaired Skin Integrity  Goal: Tissue integrity - skin and mucous membranes  Description  Structural intactness and normal physiological function of skin and  mucous membranes. Outcome: Ongoing     Problem: Falls - Risk of:  Goal: Will remain free from falls  Description  Will remain free from falls  6/14/2019 1039 by Hiren Sterling RN  Outcome: Ongoing  6/14/2019 0331 by Jose Chi RN  Note:   D: Pt.  Alert with expressive aphasia, bed alarm on and call light in reach  R: without falls this shift  A: will continue to monitor  Goal: Absence of physical injury  Description  Absence of physical injury  Outcome: Ongoing
no

## 2024-04-01 NOTE — ED PROVIDER NOTES
):  (rightward)  Visual (3. ):  (pupils equal and reactive, visual fields cannot be tested)  Facial Palsy (4. ):  (right)  Motor Arm, Left (5a. ): Some effort against gravity  Motor Arm, Right (5b. ): No drift  Motor Leg, Left (6a. ):  (cannot be tested)  Motor Leg, Right (6b. ): No drift  Limb Ataxia (7. ):  (cannot assess left)  Sensory (8. ): Normal (pt reacts on both side to light touch)  Best Language (9. ): Mute  Dysarthria (10. ): Near to unintelligible  Extinction and Inattention (11): (!) Complete neglect    DIAGNOSTIC RESULTS:  Labs resulted at the time of this note reviewed.   Labs Reviewed   COMPREHENSIVE METABOLIC PANEL W/ REFLEX TO MG FOR LOW K - Abnormal; Notable for the following:        Result Value    Potassium reflex Magnesium 5.8 (*)     Glucose 102 (*)     Total Protein 8.6 (*)     Albumin/Globulin Ratio 0.8 (*)     All other components within normal limits    Narrative:     Performed at:  [facility] [address],  [place], [place]   Phone [phone]   CBC WITH AUTO DIFFERENTIAL    Narrative:     Performed at:  [facility] [address], [place], [place]   Phone [phone]   TROPONIN    Narrative:     Performed at:  [facility] [address], [place], [place]   Phone [phone]   PROTIME-INR    Narrative:     Performed at:  [facility] [address],  [place], [place]   Phone [phone]   APTT    Narrative:     Performed at:  [facility] [address],  [place]   Phone [phone]   POCT GLUCOSE   POCT GLUCOSE    Narrative:     Performed at:  [facility] 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 Questionable   occlusion of left posterior inferior cerebellar artery. Findings were discussed with Jessica Montaño at 10:17 am on 10/7/2018. 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 Questionable   occlusion of left posterior inferior cerebellar artery. Findings were discussed with Jessica Montaño at 10:17 am on 10/7/2018. CT Head WO Contrast   Final Result   1. No intracranial hemorrhage   2. No CT changes of acute major vascular territory brain ischemia at this time           ED COURSE:  Uneventful  Vitals:    10/07/18 0931 10/07/18 1000 10/07/18 1029 10/07/18 1045   BP: (!) 159/85 (!) 166/65 (!) 158/58 (!) 162/74   Pulse: 83 63 58 69   Resp: 21 18 16 18   Temp: 97.8 °F (36.6 °C)      TempSrc: Oral      SpO2: 97% 99% 100% 100%     PROCEDURES:  None    CRITICAL CARE:  Total critical care time of 31 minutes (excludes any time for procedures). This includes but not limited to vital sign monitoring, medication, clinical response to medications, interpretation of diagnostics, review of nursing notes, pertinent record review, discussions about patient condition, consultation time, documentation time. Critical care due to the patient's CVA. CONSULTATIONS:  Dr. Ciaran Marin  Stroke Team, Hospitalist     MEDICAL DECISION MAKING: Erick Bowser is a 80 y.o. female who presented because of a stroke. [address] Stroke Team has been consulted and thrombolytics will NOT be administered due to the risk/benefit profile of the drug and the patient's clinical presentation.   I have discussed patient care options with her daughter and she did not want any aggressive interventions performed such as transfer for attempted any vascular intervention for the occlusion. I discussed the case in full with the admitting physician. Patient does have signed DNR [facility] paperwork on the chart. Differential Diagnosis:  CVA, seizure, cardiac arrhythmia, infection, dehydration, other    FINAL IMPRESSION:    1. Cerebrovascular accident (CVA), unspecified mechanism ([address]        (Please note that I used voice recognition software to generate this note.   Occasionally words are mistranscribed despite my efforts to edit errors.)        Patria Goodwin MD  10/07/18 1913 PAST MEDICAL HISTORY:  CAD (coronary artery disease)     Chronic kidney disease (CKD)     COPD with hypoxia     Diabetic foot ulcers     PAD (peripheral artery disease)     Type 2 diabetes mellitus